# Patient Record
Sex: FEMALE | Race: WHITE | Employment: FULL TIME | URBAN - METROPOLITAN AREA
[De-identification: names, ages, dates, MRNs, and addresses within clinical notes are randomized per-mention and may not be internally consistent; named-entity substitution may affect disease eponyms.]

---

## 2019-04-23 ENCOUNTER — THERAPY VISIT (OUTPATIENT)
Dept: PHYSICAL THERAPY | Facility: CLINIC | Age: 44
End: 2019-04-23
Payer: COMMERCIAL

## 2019-04-23 DIAGNOSIS — S73.191A TEAR OF RIGHT ACETABULAR LABRUM, INITIAL ENCOUNTER: ICD-10-CM

## 2019-04-23 DIAGNOSIS — Z98.890 HISTORY OF HIP SURGERY: ICD-10-CM

## 2019-04-23 DIAGNOSIS — M25.851 HIP IMPINGEMENT SYNDROME, RIGHT: ICD-10-CM

## 2019-04-23 PROCEDURE — 97110 THERAPEUTIC EXERCISES: CPT | Mod: GP

## 2019-04-23 PROCEDURE — 97161 PT EVAL LOW COMPLEX 20 MIN: CPT | Mod: GP

## 2019-04-23 ASSESSMENT — ACTIVITIES OF DAILY LIVING (ADL)
GOING_DOWN_1_FLIGHT_OF_STAIRS: EXTREME DIFFICULTY
DEEP_SQUATTING: UNABLE TO DO
HOS_ADL_SCORE(%): 14.71
WALKING_DOWN_STEEP_HILLS: UNABLE TO DO
WALKING_APPROXIMATELY_10_MINUTES: UNABLE TO DO
PUTTING_ON_SOCKS_AND_SHOES: EXTREME DIFFICULTY
HEAVY_WORK: UNABLE TO DO
GETTING_INTO_AND_OUT_OF_A_BATHTUB: UNABLE TO DO
GOING_UP_1_FLIGHT_OF_STAIRS: EXTREME DIFFICULTY
GETTING_INTO_AND_OUT_OF_AN_AVERAGE_CAR: MODERATE DIFFICULTY
TWISTING/PIVOTING_ON_INVOLVED_LEG: UNABLE TO DO
ROLLING_OVER_IN_BED: EXTREME DIFFICULTY
WALKING_15_MINUTES_OR_GREATER: UNABLE TO DO
STEPPING_UP_AND_DOWN_CURBS: EXTREME DIFFICULTY
HOS_ADL_HIGHEST_POTENTIAL_SCORE: 68
STANDING_FOR_15_MINUTES: MODERATE DIFFICULTY
WALKING_INITIALLY: MODERATE DIFFICULTY
WALKING_UP_STEEP_HILLS: UNABLE TO DO
HOW_WOULD_YOU_RATE_YOUR_CURRENT_LEVEL_OF_FUNCTION_DURING_YOUR_USUAL_ACTIVITIES_OF_DAILY_LIVING_FROM_0_TO_100_WITH_100_BEING_YOUR_LEVEL_OF_FUNCTION_PRIOR_TO_YOUR_HIP_PROBLEM_AND_0_BEING_THE_INABILITY_TO_PERFORM_ANY_OF_YOUR_USUAL_DAILY_ACTIVITIES?: 2
SITTING_FOR_15_MINUTES: MODERATE DIFFICULTY
HOS_ADL_ITEM_SCORE_TOTAL: 10
HOS_ADL_COUNT: 17
RECREATIONAL_ACTIVITIES: UNABLE TO DO
LIGHT_TO_MODERATE_WORK: UNABLE TO DO

## 2019-04-23 NOTE — LETTER
[facility]  [address] [place] [address]  [address]  [facility] [id number] [phone]    2019    Re: Kt Mast   :   1975  MRN:  [id number]   REFERRING PHYSICIAN:   Sanchez Goode    [facility] PH[facility]    Date of Initial Evaluation:  19   Visits:  Rxs Used: 1  Reason for Referral:     Hip impingement syndrome, right  Tear of right acetabular labrum, initial encounter  History of hip surgery    EVALUATION SUMMARY    [facility] Initial Evaluation    Subjective:    Kt Mast is a 43 year old female with a right hip condition.  Condition occurred with:  Other reason.  Condition occurred: other.  This is a new condition  S/P: R hip labral repair with aimee work on 19.  Likes to run and be active but not for 2 years due to pain.  Has 7 & 8 y/o kids.  .    Patient reports pain:  Joint.     and is intermittent and reported as 9/10.  Associated symptoms:  Loss of motion/stiffness and loss of strength. Pain is the same all the time.  Symptoms are exacerbated by activity and descending stairs and relieved by nothing.  Since onset symptoms are gradually improving.                        Pertinent medical history includes:  Cancer (Basal cell carcinoma).  Medical allergies: no.    Current medications:  Anti-inflammatory and pain medication.  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           Objective:    Gait:  20# max foot-flat R LE  Gait Type:  Antalgic   Weight Bearing Status:  PWB   Assistive Devices:  Crutches  Hip Evaluation  Hip PROM:    Flexion: Left:  Right: 75  Extension: Left:  Right: 10  Abduction: Left:   Right: 10  Internal Rotation: Left:   Right: 10  External Rotation: Left:   Right: 10  Hip Strength:  : Deferred at this time.  Hip Palpation:  Palpations normal left hip: MIld TTP about surgery  site.    Re: Kt Mast   :   1975            Assessment/Plan:      Patient is a 43 year old female with right side hip complaints.    Patient has the following significant findings with corresponding treatment plan.                Diagnosis 1:  S/P R hip labral repair with aimee work 19  Decreased ROM/flexibility - manual therapy and therapeutic exercise  Decreased strength - therapeutic exercise and therapeutic activities  Impaired muscle performance - neuro re-education  Decreased function - therapeutic activities    Therapy Evaluation Codes:     1) Clinical presentation characteristics are:   Stable/Uncomplicated.  2) Decision-Making    Low complexity using standardized patient assessment instrument and/or measureable assessment of functional outcome.  Cumulative Therapy Evaluation is: Low complexity.  Previous and current functional limitations:  (See Goal Flow Sheet for this information)    Short term and Long term goals: (See Goal Flow Sheet for this information)   Communication ability:  Patient appears to be able to clearly communicate and understand verbal and written communication and follow directions correctly.  Treatment Explanation - The following has been discussed with the patient:   RX ordered/plan of care  Anticipated outcomes  Possible risks and side effects  This patient would benefit from PT intervention to resume normal activities.   Rehab potential is good.  Frequency:  2 X week, once daily  Duration:  for 4 weeks followed by 1x/wk x 4-6 wks  Discharge Plan:  Achieve all LTG.  Independent in home treatment program.  Reach maximal therapeutic benefit.             Thank you for your referral.    INQUIRIES  Therapist:  Kam Castro, PT   [facility]  [address] [place] [address]  [address]  [facility] [address]  Phone: [phone]  Fax: [phone]

## 2019-04-23 NOTE — PROGRESS NOTES
[facility] Initial Evaluation  Subjective:                                       Pertinent medical history includes:  Cancer (Basal cell carcinoma).  Medical allergies: no.    Current medications:  Anti-inflammatory and pain medication.  Current occupation is   .    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                                Objective:  System    Physical Exam    General     ROS    Assessment/Plan:

## 2019-04-23 NOTE — PROGRESS NOTES
[facility] Initial Evaluation  Subjective:    Kt Mast is a 43 year old female with a right hip condition.  Condition occurred with:  Other reason.  Condition occurred: other.  This is a new condition  S/P: R hip labral repair with aimee work on 4/22/19.  Likes to run and be active but not for 2 years due to pain.  Has 7 & 8 y/o kids.  .    Patient reports pain:  Joint.     and is intermittent and reported as 9/10.  Associated symptoms:  Loss of motion/stiffness and loss of strength. Pain is the same all the time.  Symptoms are exacerbated by activity and descending stairs and relieved by nothing.  Since onset symptoms are gradually improving.                                                      Objective:    Gait:  20# max foot-flat R LE  Gait Type:  Antalgic   Weight Bearing Status:  PWB   Assistive Devices:  Crutches                                                   Hip Evaluation  Hip PROM:    Flexion: Left:  Right: 75  Extension: Left:  Right: 10  Abduction: Left:   Right: 10    Internal Rotation: Left:   Right: 10  External Rotation: Left:   Right: 10              Hip Strength:  : Deferred at this time.                            Hip Palpation:  Palpations normal left hip: MIld TTP about surgery site.                 General     ROS    Assessment/Plan:    Patient is a 43 year old female with right side hip complaints.    Patient has the following significant findings with corresponding treatment plan.                Diagnosis 1:  S/P R hip labral repair with aimee work 4/22/19  Decreased ROM/flexibility - manual therapy and therapeutic exercise  Decreased strength - therapeutic exercise and therapeutic activities  Impaired muscle performance - neuro re-education  Decreased function - therapeutic activities    Therapy Evaluation Codes:          1) Clinical presentation characteristics are:   Stable/Uncomplicated.  2) Decision-Making    Low complexity using standardized patient  assessment instrument and/or measureable assessment of functional outcome.  Cumulative Therapy Evaluation is: Low complexity.    Previous and current functional limitations:  (See Goal Flow Sheet for this information)    Short term and Long term goals: (See Goal Flow Sheet for this information)     Communication ability:  Patient appears to be able to clearly communicate and understand verbal and written communication and follow directions correctly.  Treatment Explanation - The following has been discussed with the patient:   RX ordered/plan of care  Anticipated outcomes  Possible risks and side effects  This patient would benefit from PT intervention to resume normal activities.   Rehab potential is good.    Frequency:  2 X week, once daily  Duration:  for 4 weeks followed by 1x/wk x 4-6 wks  Discharge Plan:  Achieve all LTG.  Independent in home treatment program.  Reach maximal therapeutic benefit.    Please refer to the daily flowsheet for treatment today, total treatment time and time spent performing 1:1 timed codes.

## 2019-04-26 ENCOUNTER — THERAPY VISIT (OUTPATIENT)
Dept: PHYSICAL THERAPY | Facility: CLINIC | Age: 44
End: 2019-04-26
Payer: COMMERCIAL

## 2019-04-26 DIAGNOSIS — S73.191A TEAR OF RIGHT ACETABULAR LABRUM, INITIAL ENCOUNTER: ICD-10-CM

## 2019-04-26 DIAGNOSIS — M25.851 HIP IMPINGEMENT SYNDROME, RIGHT: ICD-10-CM

## 2019-04-26 DIAGNOSIS — Z98.890 HISTORY OF HIP SURGERY: ICD-10-CM

## 2019-04-26 PROCEDURE — 97110 THERAPEUTIC EXERCISES: CPT | Mod: GP

## 2019-04-26 PROCEDURE — 97140 MANUAL THERAPY 1/> REGIONS: CPT | Mod: GP

## 2019-04-29 ENCOUNTER — THERAPY VISIT (OUTPATIENT)
Dept: PHYSICAL THERAPY | Facility: CLINIC | Age: 44
End: 2019-04-29
Payer: COMMERCIAL

## 2019-04-29 DIAGNOSIS — S73.191A TEAR OF RIGHT ACETABULAR LABRUM, INITIAL ENCOUNTER: ICD-10-CM

## 2019-04-29 DIAGNOSIS — Z98.890 HISTORY OF HIP SURGERY: ICD-10-CM

## 2019-04-29 DIAGNOSIS — M25.851 HIP IMPINGEMENT SYNDROME, RIGHT: ICD-10-CM

## 2019-04-29 PROCEDURE — 97140 MANUAL THERAPY 1/> REGIONS: CPT | Mod: GP

## 2019-04-29 PROCEDURE — 97110 THERAPEUTIC EXERCISES: CPT | Mod: GP

## 2019-05-01 ENCOUNTER — THERAPY VISIT (OUTPATIENT)
Dept: PHYSICAL THERAPY | Facility: CLINIC | Age: 44
End: 2019-05-01
Payer: COMMERCIAL

## 2019-05-01 DIAGNOSIS — Z98.890 HISTORY OF HIP SURGERY: ICD-10-CM

## 2019-05-01 DIAGNOSIS — S73.191A TEAR OF RIGHT ACETABULAR LABRUM, INITIAL ENCOUNTER: ICD-10-CM

## 2019-05-01 DIAGNOSIS — M25.851 HIP IMPINGEMENT SYNDROME, RIGHT: ICD-10-CM

## 2019-05-01 PROCEDURE — 97110 THERAPEUTIC EXERCISES: CPT | Mod: GP

## 2019-05-01 PROCEDURE — 97140 MANUAL THERAPY 1/> REGIONS: CPT | Mod: GP

## 2019-05-06 ENCOUNTER — THERAPY VISIT (OUTPATIENT)
Dept: PHYSICAL THERAPY | Facility: CLINIC | Age: 44
End: 2019-05-06
Payer: COMMERCIAL

## 2019-05-06 DIAGNOSIS — S73.191A TEAR OF RIGHT ACETABULAR LABRUM, INITIAL ENCOUNTER: ICD-10-CM

## 2019-05-06 DIAGNOSIS — M25.851 HIP IMPINGEMENT SYNDROME, RIGHT: ICD-10-CM

## 2019-05-06 DIAGNOSIS — Z98.890 HISTORY OF HIP SURGERY: ICD-10-CM

## 2019-05-06 PROCEDURE — 97110 THERAPEUTIC EXERCISES: CPT | Mod: GP

## 2019-05-06 PROCEDURE — 97140 MANUAL THERAPY 1/> REGIONS: CPT | Mod: GP

## 2019-05-08 ENCOUNTER — THERAPY VISIT (OUTPATIENT)
Dept: PHYSICAL THERAPY | Facility: CLINIC | Age: 44
End: 2019-05-08
Payer: COMMERCIAL

## 2019-05-08 DIAGNOSIS — Z98.890 HISTORY OF HIP SURGERY: ICD-10-CM

## 2019-05-08 DIAGNOSIS — M25.851 HIP IMPINGEMENT SYNDROME, RIGHT: ICD-10-CM

## 2019-05-08 DIAGNOSIS — S73.191A TEAR OF RIGHT ACETABULAR LABRUM, INITIAL ENCOUNTER: ICD-10-CM

## 2019-05-08 PROCEDURE — 97110 THERAPEUTIC EXERCISES: CPT | Mod: GP

## 2019-05-08 PROCEDURE — 97140 MANUAL THERAPY 1/> REGIONS: CPT | Mod: GP

## 2019-05-13 ENCOUNTER — THERAPY VISIT (OUTPATIENT)
Dept: PHYSICAL THERAPY | Facility: CLINIC | Age: 44
End: 2019-05-13
Payer: COMMERCIAL

## 2019-05-13 DIAGNOSIS — M25.851 HIP IMPINGEMENT SYNDROME, RIGHT: ICD-10-CM

## 2019-05-13 DIAGNOSIS — Z98.890 HISTORY OF HIP SURGERY: ICD-10-CM

## 2019-05-13 DIAGNOSIS — S73.191A TEAR OF RIGHT ACETABULAR LABRUM, INITIAL ENCOUNTER: ICD-10-CM

## 2019-05-13 PROCEDURE — 97110 THERAPEUTIC EXERCISES: CPT | Mod: GP

## 2019-05-13 PROCEDURE — 97140 MANUAL THERAPY 1/> REGIONS: CPT | Mod: GP

## 2019-05-15 ENCOUNTER — THERAPY VISIT (OUTPATIENT)
Dept: PHYSICAL THERAPY | Facility: CLINIC | Age: 44
End: 2019-05-15
Payer: COMMERCIAL

## 2019-05-15 DIAGNOSIS — M25.851 HIP IMPINGEMENT SYNDROME, RIGHT: ICD-10-CM

## 2019-05-15 DIAGNOSIS — S73.191A TEAR OF RIGHT ACETABULAR LABRUM, INITIAL ENCOUNTER: ICD-10-CM

## 2019-05-15 DIAGNOSIS — Z98.890 HISTORY OF HIP SURGERY: ICD-10-CM

## 2019-05-15 PROCEDURE — 97110 THERAPEUTIC EXERCISES: CPT | Mod: GP

## 2019-05-15 PROCEDURE — 97140 MANUAL THERAPY 1/> REGIONS: CPT | Mod: GP

## 2019-05-20 ENCOUNTER — THERAPY VISIT (OUTPATIENT)
Dept: PHYSICAL THERAPY | Facility: CLINIC | Age: 44
End: 2019-05-20
Payer: COMMERCIAL

## 2019-05-20 DIAGNOSIS — S73.191A TEAR OF RIGHT ACETABULAR LABRUM, INITIAL ENCOUNTER: ICD-10-CM

## 2019-05-20 DIAGNOSIS — M25.851 HIP IMPINGEMENT SYNDROME, RIGHT: ICD-10-CM

## 2019-05-20 DIAGNOSIS — Z98.890 HISTORY OF HIP SURGERY: ICD-10-CM

## 2019-05-20 PROCEDURE — 97110 THERAPEUTIC EXERCISES: CPT | Mod: GP

## 2019-05-20 PROCEDURE — 97530 THERAPEUTIC ACTIVITIES: CPT | Mod: GP

## 2019-05-29 ENCOUNTER — THERAPY VISIT (OUTPATIENT)
Dept: PHYSICAL THERAPY | Facility: CLINIC | Age: 44
End: 2019-05-29
Payer: COMMERCIAL

## 2019-05-29 DIAGNOSIS — Z98.890 HISTORY OF HIP SURGERY: ICD-10-CM

## 2019-05-29 DIAGNOSIS — M25.851 HIP IMPINGEMENT SYNDROME, RIGHT: ICD-10-CM

## 2019-05-29 DIAGNOSIS — S73.191A TEAR OF RIGHT ACETABULAR LABRUM, INITIAL ENCOUNTER: ICD-10-CM

## 2019-05-29 PROCEDURE — 97110 THERAPEUTIC EXERCISES: CPT | Mod: GP

## 2019-05-29 PROCEDURE — 97530 THERAPEUTIC ACTIVITIES: CPT | Mod: GP

## 2019-06-12 ENCOUNTER — THERAPY VISIT (OUTPATIENT)
Dept: PHYSICAL THERAPY | Facility: CLINIC | Age: 44
End: 2019-06-12
Payer: COMMERCIAL

## 2019-06-12 DIAGNOSIS — S73.191A TEAR OF RIGHT ACETABULAR LABRUM, INITIAL ENCOUNTER: ICD-10-CM

## 2019-06-12 DIAGNOSIS — M25.851 HIP IMPINGEMENT SYNDROME, RIGHT: ICD-10-CM

## 2019-06-12 DIAGNOSIS — Z98.890 HISTORY OF HIP SURGERY: ICD-10-CM

## 2019-06-12 PROCEDURE — 97530 THERAPEUTIC ACTIVITIES: CPT | Mod: GP

## 2019-06-12 PROCEDURE — 97140 MANUAL THERAPY 1/> REGIONS: CPT | Mod: GP

## 2019-06-12 PROCEDURE — 97110 THERAPEUTIC EXERCISES: CPT | Mod: GP

## 2019-06-17 ENCOUNTER — THERAPY VISIT (OUTPATIENT)
Dept: PHYSICAL THERAPY | Facility: CLINIC | Age: 44
End: 2019-06-17
Payer: COMMERCIAL

## 2019-06-17 DIAGNOSIS — S73.191A TEAR OF RIGHT ACETABULAR LABRUM, INITIAL ENCOUNTER: ICD-10-CM

## 2019-06-17 DIAGNOSIS — Z98.890 HISTORY OF HIP SURGERY: ICD-10-CM

## 2019-06-17 DIAGNOSIS — M25.851 HIP IMPINGEMENT SYNDROME, RIGHT: ICD-10-CM

## 2019-06-17 PROCEDURE — 97530 THERAPEUTIC ACTIVITIES: CPT | Mod: GP

## 2019-06-17 PROCEDURE — 97110 THERAPEUTIC EXERCISES: CPT | Mod: GP

## 2019-06-17 ASSESSMENT — ACTIVITIES OF DAILY LIVING (ADL)
WALKING_15_MINUTES_OR_GREATER: EXTREME DIFFICULTY
DEEP_SQUATTING: SLIGHT DIFFICULTY
HOS_ADL_COUNT: 17
WALKING_APPROXIMATELY_10_MINUTES: MODERATE DIFFICULTY
STANDING_FOR_15_MINUTES: SLIGHT DIFFICULTY
STEPPING_UP_AND_DOWN_CURBS: SLIGHT DIFFICULTY
WALKING_INITIALLY: SLIGHT DIFFICULTY
HOS_ADL_ITEM_SCORE_TOTAL: 31
GOING_DOWN_1_FLIGHT_OF_STAIRS: NO DIFFICULTY AT ALL
WALKING_UP_STEEP_HILLS: EXTREME DIFFICULTY
HOS_ADL_SCORE(%): 45.59
TWISTING/PIVOTING_ON_INVOLVED_LEG: UNABLE TO DO
ROLLING_OVER_IN_BED: SLIGHT DIFFICULTY
RECREATIONAL_ACTIVITIES: UNABLE TO DO
WALKING_DOWN_STEEP_HILLS: UNABLE TO DO
SITTING_FOR_15_MINUTES: SLIGHT DIFFICULTY
HEAVY_WORK: UNABLE TO DO
HOS_ADL_HIGHEST_POTENTIAL_SCORE: 68
GETTING_INTO_AND_OUT_OF_AN_AVERAGE_CAR: SLIGHT DIFFICULTY
PUTTING_ON_SOCKS_AND_SHOES: SLIGHT DIFFICULTY
HOW_WOULD_YOU_RATE_YOUR_CURRENT_LEVEL_OF_FUNCTION_DURING_YOUR_USUAL_ACTIVITIES_OF_DAILY_LIVING_FROM_0_TO_100_WITH_100_BEING_YOUR_LEVEL_OF_FUNCTION_PRIOR_TO_YOUR_HIP_PROBLEM_AND_0_BEING_THE_INABILITY_TO_PERFORM_ANY_OF_YOUR_USUAL_DAILY_ACTIVITIES?: 50
GOING_UP_1_FLIGHT_OF_STAIRS: MODERATE DIFFICULTY
GETTING_INTO_AND_OUT_OF_A_BATHTUB: UNABLE TO DO
LIGHT_TO_MODERATE_WORK: SLIGHT DIFFICULTY

## 2019-06-24 ENCOUNTER — THERAPY VISIT (OUTPATIENT)
Dept: PHYSICAL THERAPY | Facility: CLINIC | Age: 44
End: 2019-06-24
Payer: COMMERCIAL

## 2019-06-24 DIAGNOSIS — S73.191A TEAR OF RIGHT ACETABULAR LABRUM, INITIAL ENCOUNTER: ICD-10-CM

## 2019-06-24 DIAGNOSIS — Z98.890 HISTORY OF HIP SURGERY: ICD-10-CM

## 2019-06-24 DIAGNOSIS — M25.851 HIP IMPINGEMENT SYNDROME, RIGHT: ICD-10-CM

## 2019-06-24 PROCEDURE — 97110 THERAPEUTIC EXERCISES: CPT | Mod: GP

## 2019-06-24 PROCEDURE — 97530 THERAPEUTIC ACTIVITIES: CPT | Mod: GP

## 2019-07-01 ENCOUNTER — THERAPY VISIT (OUTPATIENT)
Dept: PHYSICAL THERAPY | Facility: CLINIC | Age: 44
End: 2019-07-01
Payer: COMMERCIAL

## 2019-07-01 DIAGNOSIS — Z98.890 HISTORY OF HIP SURGERY: ICD-10-CM

## 2019-07-01 DIAGNOSIS — M25.851 HIP IMPINGEMENT SYNDROME, RIGHT: ICD-10-CM

## 2019-07-01 DIAGNOSIS — S73.191A TEAR OF RIGHT ACETABULAR LABRUM, INITIAL ENCOUNTER: ICD-10-CM

## 2019-07-01 PROCEDURE — 97530 THERAPEUTIC ACTIVITIES: CPT | Mod: GP

## 2019-07-01 PROCEDURE — 97110 THERAPEUTIC EXERCISES: CPT | Mod: GP

## 2019-07-15 ENCOUNTER — THERAPY VISIT (OUTPATIENT)
Dept: PHYSICAL THERAPY | Facility: CLINIC | Age: 44
End: 2019-07-15
Payer: COMMERCIAL

## 2019-07-15 DIAGNOSIS — Z98.890 HISTORY OF HIP SURGERY: ICD-10-CM

## 2019-07-15 DIAGNOSIS — M25.851 HIP IMPINGEMENT SYNDROME, RIGHT: ICD-10-CM

## 2019-07-15 DIAGNOSIS — S73.191A TEAR OF RIGHT ACETABULAR LABRUM: ICD-10-CM

## 2019-07-15 PROCEDURE — 97112 NEUROMUSCULAR REEDUCATION: CPT | Mod: GP

## 2019-07-15 PROCEDURE — 97530 THERAPEUTIC ACTIVITIES: CPT | Mod: GP

## 2019-07-15 PROCEDURE — 97110 THERAPEUTIC EXERCISES: CPT | Mod: GP

## 2019-07-29 ENCOUNTER — THERAPY VISIT (OUTPATIENT)
Dept: PHYSICAL THERAPY | Facility: CLINIC | Age: 44
End: 2019-07-29
Payer: COMMERCIAL

## 2019-07-29 DIAGNOSIS — Z98.890 HISTORY OF HIP SURGERY: ICD-10-CM

## 2019-07-29 DIAGNOSIS — M25.851 HIP IMPINGEMENT SYNDROME, RIGHT: ICD-10-CM

## 2019-07-29 DIAGNOSIS — S73.191A TEAR OF RIGHT ACETABULAR LABRUM, INITIAL ENCOUNTER: ICD-10-CM

## 2019-07-29 PROCEDURE — 97110 THERAPEUTIC EXERCISES: CPT | Mod: GP

## 2019-07-29 PROCEDURE — 97112 NEUROMUSCULAR REEDUCATION: CPT | Mod: GP

## 2019-07-29 PROCEDURE — 97530 THERAPEUTIC ACTIVITIES: CPT | Mod: GP

## 2019-07-29 ASSESSMENT — ACTIVITIES OF DAILY LIVING (ADL)
STANDING_FOR_15_MINUTES: SLIGHT DIFFICULTY
HOS_ADL_COUNT: 17
WALKING_UP_STEEP_HILLS: MODERATE DIFFICULTY
WALKING_APPROXIMATELY_10_MINUTES: SLIGHT DIFFICULTY
LIGHT_TO_MODERATE_WORK: SLIGHT DIFFICULTY
HEAVY_WORK: EXTREME DIFFICULTY
PUTTING_ON_SOCKS_AND_SHOES: SLIGHT DIFFICULTY
HOS_ADL_SCORE(%): 63.24
WALKING_INITIALLY: SLIGHT DIFFICULTY
DEEP_SQUATTING: SLIGHT DIFFICULTY
ROLLING_OVER_IN_BED: SLIGHT DIFFICULTY
GETTING_INTO_AND_OUT_OF_A_BATHTUB: SLIGHT DIFFICULTY
STEPPING_UP_AND_DOWN_CURBS: SLIGHT DIFFICULTY
TWISTING/PIVOTING_ON_INVOLVED_LEG: EXTREME DIFFICULTY
HOS_ADL_ITEM_SCORE_TOTAL: 43
GOING_UP_1_FLIGHT_OF_STAIRS: SLIGHT DIFFICULTY
GOING_DOWN_1_FLIGHT_OF_STAIRS: NO DIFFICULTY AT ALL
WALKING_DOWN_STEEP_HILLS: EXTREME DIFFICULTY
HOS_ADL_HIGHEST_POTENTIAL_SCORE: 68
SITTING_FOR_15_MINUTES: NO DIFFICULTY AT ALL
WALKING_15_MINUTES_OR_GREATER: SLIGHT DIFFICULTY
GETTING_INTO_AND_OUT_OF_AN_AVERAGE_CAR: SLIGHT DIFFICULTY
RECREATIONAL_ACTIVITIES: EXTREME DIFFICULTY

## 2019-07-30 ENCOUNTER — THERAPY VISIT (OUTPATIENT)
Dept: PHYSICAL THERAPY | Facility: CLINIC | Age: 44
End: 2019-07-30
Payer: COMMERCIAL

## 2019-07-30 DIAGNOSIS — M54.41 ACUTE RIGHT-SIDED LOW BACK PAIN WITH RIGHT-SIDED SCIATICA: ICD-10-CM

## 2019-07-30 PROCEDURE — 97530 THERAPEUTIC ACTIVITIES: CPT | Mod: GP | Performed by: PHYSICAL THERAPIST

## 2019-07-30 PROCEDURE — 97161 PT EVAL LOW COMPLEX 20 MIN: CPT | Mod: GP | Performed by: PHYSICAL THERAPIST

## 2019-07-30 PROCEDURE — 97110 THERAPEUTIC EXERCISES: CPT | Mod: GP | Performed by: PHYSICAL THERAPIST

## 2019-07-30 NOTE — PROGRESS NOTES
[facility] Initial Evaluation  Subjective:  Kt reports that she underwent a R hip arthroscopic surgery on 4/22/19.  Since her surgery, her R LB/SIJ has been bothersome.  She returned to see Dr. Goode on 7/2/19 and was referred to PT for evaluation and treatment of R SIJ/R LBP.  Kt reports that prior to her surgery she had some of her current issues but they were improved with surgery- started to return 7-8 weeks post-op.  In the past, Kt has undergone PT for low back and hip but with only temporary relief.      The history is provided by the patient. No  was used.   Kt Mast being seen for LBP/R SIJ pain.   Problem began 7/2/2019 (MD order). Where condition occurred: for unknown reasons.Problem occurred: no cause  and reported as 7/10 on pain scale. General health as reported by patient is good. Pertinent medical history includes:  Cancer (basal cell carcinoma).  Medical allergies: none reported.  Surgeries include:  None.  Current medications:  None.   Primary job tasks include:  Computer work, prolonged sitting and prolonged standing.  Pain is described as burning (tight) and is constant. Pain timing: activity-dependent. Since onset symptoms are unchanged. Imaging testing: no recent lumbar imaging.     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 Restrictions include:  Working in normal job without restrictions.    Barriers include:  None as reported by patient.  Red flags:  None as reported by patient.  Type of problem:  Lumbar and sacroiliac   Condition occurred with:  Insidious onset. This is a recurrent condition    Patient reports pain:  Lower lumbar spine and SI joint right. Radiates to:  Gluteals right, thigh right, foot right and lower leg right. Associated symptoms:  Numbness (incisional numbness). Symptoms are exacerbated by sitting, standing, certain positions, carrying, bending, lifting, lying down and walking and relieved by rest  (temporary relief from adductor shotgun).                      Objective:  System    Physical Exam      [facility] Lumbar Evaluation    Posture:  Sitting: fair  Standing: fair  Lordosis: Reduced    Correction of Posture: better    Movement Loss:  Flexion (Flex): mod  Extension (EXT): mod  Side Glide R (SG R): mod and pain  Side Glide L (SG L): min  Test Movements:    EIS: During: increases  After: no worse  Mechanical Response: no effect      EIL: During: abolishes  After: no better  Mechanical Response: no effect  Repeat EIL: During: abolishes  After: no better  Mechanical Response: no effect  SGIS R: During: increases and centralizing  After: no better  Mechanical Response: no effect  Repeat SGIS R: During: decreases and centralizing  After: better  Mechanical Response: IncROM      Conclusion: derangement  Principle of Treatment:  Posture Correction: mild correction of sitting posture with lumbar roll- as long as decreases pain as does in clinic- discussed variable nature of lateral shift and non-use of roll if radicular pain increases.       Lateral: repeated R SGIG x10-20, every 2-3 hours or as needed for relief                                     Musculoskeletal:        Legs:      ROS  Discussed current HEP for R hip strengthening and instructed pt to temporarily avoid any exercises or activity that increased or peripheralizes radicular symptoms.  No SIJ evaluation today as symptoms were more centrally located in lumbar spine and indicative of higher lumbar spine as source; will evaluate SIJ specifically as needed.    Assessment/Plan:    Patient is a 43 year old female with lumbar complaints.    Patient has the following significant findings with corresponding treatment plan.                Diagnosis 1:  R LBP/R SIJ pain with R sciatica  Pain -  hot/cold therapy, manual therapy, self management, education, directional preference exercise and home program  Decreased ROM/flexibility - manual therapy, therapeutic  exercise and home program  Decreased joint mobility - manual therapy, therapeutic exercise and home program  Impaired gait - gait training and home program  Impaired muscle performance - neuro re-education and home program  Decreased function - therapeutic activities and home program  Impaired posture - neuro re-education and home program    Therapy Evaluation Codes:   1) History comprised of:   Personal factors that impact the plan of care:      None.    Comorbidity factors that impact the plan of care are:      None.     Medications impacting care: None.  2) Examination of Body Systems comprised of:   Body structures and functions that impact the plan of care:      Lumbar spine and Sacral illiac joint.   Activity limitations that impact the plan of care are:      Bending, Dressing, Lifting, Sitting, Sports, Squatting/kneeling, Stairs, Standing, Walking, Sleeping and Laying down.  3) Clinical presentation characteristics are:   Stable/Uncomplicated.  4) Decision-Making    Low complexity using standardized patient assessment instrument and/or measureable assessment of functional outcome.  Cumulative Therapy Evaluation is: Low complexity.    Previous and current functional limitations:  (See Goal Flow Sheet for this information)    Short term and Long term goals: (See Goal Flow Sheet for this information)     Communication ability:  Patient appears to be able to clearly communicate and understand verbal and written communication and follow directions correctly.  Treatment Explanation - The following has been discussed with the patient:   RX ordered/plan of care  Anticipated outcomes  Possible risks and side effects  This patient would benefit from PT intervention to resume normal activities.   Rehab potential is excellent.    Frequency:  1 X week, once daily  Duration:  for 4 weeks (up to 4 visits per MD).   Discharge Plan:  Achieve all LTG.  Independent in home treatment program.  Reach maximal therapeutic  benefit.    Please refer to the daily flowsheet for treatment today, total treatment time and time spent performing 1:1 timed codes.

## 2019-08-06 ENCOUNTER — THERAPY VISIT (OUTPATIENT)
Dept: PHYSICAL THERAPY | Facility: CLINIC | Age: 44
End: 2019-08-06
Payer: COMMERCIAL

## 2019-08-06 DIAGNOSIS — M54.41 ACUTE RIGHT-SIDED LOW BACK PAIN WITH RIGHT-SIDED SCIATICA: ICD-10-CM

## 2019-08-06 PROCEDURE — 97530 THERAPEUTIC ACTIVITIES: CPT | Mod: GP | Performed by: PHYSICAL THERAPIST

## 2019-08-06 PROCEDURE — 97110 THERAPEUTIC EXERCISES: CPT | Mod: GP | Performed by: PHYSICAL THERAPIST

## 2019-08-09 ENCOUNTER — THERAPY VISIT (OUTPATIENT)
Dept: PHYSICAL THERAPY | Facility: CLINIC | Age: 44
End: 2019-08-09
Payer: COMMERCIAL

## 2019-08-09 DIAGNOSIS — M54.41 ACUTE RIGHT-SIDED LOW BACK PAIN WITH RIGHT-SIDED SCIATICA: ICD-10-CM

## 2019-08-09 PROCEDURE — 97530 THERAPEUTIC ACTIVITIES: CPT | Mod: GP | Performed by: PHYSICAL THERAPIST

## 2019-08-09 PROCEDURE — 97110 THERAPEUTIC EXERCISES: CPT | Mod: GP | Performed by: PHYSICAL THERAPIST

## 2019-08-26 ENCOUNTER — THERAPY VISIT (OUTPATIENT)
Dept: PHYSICAL THERAPY | Facility: CLINIC | Age: 44
End: 2019-08-26
Payer: COMMERCIAL

## 2019-08-26 DIAGNOSIS — M25.851 HIP IMPINGEMENT SYNDROME, RIGHT: ICD-10-CM

## 2019-08-26 DIAGNOSIS — Z98.890 HISTORY OF HIP SURGERY: ICD-10-CM

## 2019-08-26 DIAGNOSIS — S73.191A TEAR OF RIGHT ACETABULAR LABRUM, INITIAL ENCOUNTER: ICD-10-CM

## 2019-08-26 PROCEDURE — 97112 NEUROMUSCULAR REEDUCATION: CPT | Mod: GP

## 2019-08-26 PROCEDURE — 97530 THERAPEUTIC ACTIVITIES: CPT | Mod: GP

## 2019-08-29 ENCOUNTER — THERAPY VISIT (OUTPATIENT)
Dept: PHYSICAL THERAPY | Facility: CLINIC | Age: 44
End: 2019-08-29
Payer: COMMERCIAL

## 2019-08-29 DIAGNOSIS — M54.41 ACUTE RIGHT-SIDED LOW BACK PAIN WITH RIGHT-SIDED SCIATICA: ICD-10-CM

## 2019-08-29 PROCEDURE — 97140 MANUAL THERAPY 1/> REGIONS: CPT | Mod: GP | Performed by: PHYSICAL THERAPIST

## 2019-08-29 PROCEDURE — 97110 THERAPEUTIC EXERCISES: CPT | Mod: GP | Performed by: PHYSICAL THERAPIST

## 2019-08-29 PROCEDURE — 97530 THERAPEUTIC ACTIVITIES: CPT | Mod: GP | Performed by: PHYSICAL THERAPIST

## 2019-08-29 NOTE — LETTER
[facility] PHYSICAL THERAPY  [address]  [place] [id number] [phone]    2019    Re: Kt Mast   :   1975  MRN:  [id number]   REFERRING PHYSICIAN:   Sanchez Goode    [facility] PH[facility]ICAL [facility]    Date of Initial Evaluation:  2019  Visits:  Rxs Used: 4  Reason for Referral:  Acute right-sided low back pain with right-sided sciatica    PROGRESS  REPORT    Progress reporting period is from 19 to 19.       SUBJECTIVE  Subjective: Pain has moved from her hip/glute to the middle of her back.  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  Able to do lock and sag with some pain but overall pain redcued after the exercises.  Doing pressups 2x/day.  Feels that she needs more low back PT in order to continue to progress- feels like this is actually making changes and improvements and she would like to continue.  Sitting at work continues to increase pain, has changed chair 3 times and has best chair now that she has had yet.  Does have standing desk but uses it very little- only aboe to stand for about 15 min.      Current Pain level: 4/10.     Previous pain level was  5/10 Initial Pain level: 7/10.   Changes in function:  Yes (See Goal flowsheet attached for changes in current functional level)  Adverse reaction to treatment or activity: None    OBJECTIVE  Changes noted in objective findings:  Yes, please see below.  Objective: Lumbar AROM: flex mod loss + pain in central LB, ext min loss + mild pinch in central LB, B SG min loss + pain with R SG.  Kt no longer presents with a L lateral lumbar shift and now gets relief from lumbar extension movements in prone ( REIL and REIL with self OP) rather than standing sideglide at wall.  Pain is now centralized and local to lower lumbar spine, no radiation into R gluteals and lateral hip.  Kt has been  instructed increase the frequency of her REIL exercises to every 2-3 hours (6-8x/day) to maintain pain reduction and allow soft tissue healing to occur. Also, recommended to utilize standing option for working- 15 min out of every hour, progressing towards 50:50 (sit:stand) as able for her work day.  At this time, Kt continues to require PT for lumbar spine in order to further reduce pain, improve body mechanics and return to previous level of function.       ASSESSMENT/PLAN  Updated problem list and treatment plan: Diagnosis 1:  R LBP pain with R sciatica  Pain  Re: Kt Mast   :   1975         -  hot/cold therapy, manual therapy, self management, education, directional preference exercise and home program  Decreased ROM/flexibility - manual therapy, therapeutic exercise and home program  Decreased joint mobility - manual therapy, therapeutic exercise and home program  Impaired muscle performance - neuro re-education and home program  Decreased function - therapeutic activities and home program  Impaired posture - neuro re-education and home program  STG/LTGs have been met or progress has been made towards goals:  Yes (See Goal flow sheet completed today.)  Assessment of Progress: The patient's condition is improving.  Self Management Plans:  Patient has been instructed in a home treatment program.  Patient  has been instructed in self management of symptoms.  I have re-evaluated this patient and find that the nature, scope, duration and intensity of the therapy is appropriate for the medical condition of the patient.  Kt continues to require the following intervention to meet STG and LTG's:  PT    Recommendations:  This patient would benefit from continued therapy.     Frequency:  1 X week, once daily  Duration:  for 4 weeks, tapering to 2x/month for 1 month    Thank you for your referral.      INQUIRIES  Therapist: Val Benavides DPT   [facility]  THERAPY  [address]  [facility] [address]  Phone: [phone]  Fax: [phone]

## 2019-08-29 NOTE — PROGRESS NOTES
Subjective:  HPI  Oswestry Score: 38 %                 Objective:  System    Physical Exam    General     ROS    Assessment/Plan:    PROGRESS  REPORT    Progress reporting period is from 7/30/19 to 8/29/19.       SUBJECTIVE  Subjective: Pain has moved from her hip/glute to the middle of her back.  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  Able to do lock and sag with some pain but overall pain redcued after the exercises.  Doing pressups 2x/day.  Feels that she needs more low back PT in order to continue to progress- feels like this is actually making changes and improvements and she would like to continue.  Sitting at work continues to increase pain, has changed chair 3 times and has best chair now that she has had yet.  Does have standing desk but uses it very little- only aboe to stand for about 15 min.      Current Pain level: 4/10.     Previous pain level was  5/10 Initial Pain level: 7/10.   Changes in function:  Yes (See Goal flowsheet attached for changes in current functional level)  Adverse reaction to treatment or activity: None    OBJECTIVE  Changes noted in objective findings:  Yes, please see below.  Objective: Lumbar AROM: flex mod loss + pain in central LB, ext min loss + mild pinch in central LB, B SG min loss + pain with R SG.  Kt no longer presents with a L lateral lumbar shift and now gets relief from lumbar extension movements in prone ( REIL and REIL with self OP) rather than standing sideglide at wall.  Pain is now centralized and local to lower lumbar spine, no radiation into R gluteals and lateral hip.  Kt has been instructed increase the frequency of her REIL exercises to every 2-3 hours (6-8x/day) to maintain pain reduction and allow soft tissue healing to occur. Also, recommended to utilize standing option for working- 15 min out of every hour, progressing towards 50:50 (sit:stand) as able for her work day.  At this time, Kt continues to require  PT for lumbar spine in order to further reduce pain, improve body mechanics and return to previous level of function.       ASSESSMENT/PLAN  Updated problem list and treatment plan: Diagnosis 1:  R LBP pain with R sciatica  Pain -  hot/cold therapy, manual therapy, self management, education, directional preference exercise and home program  Decreased ROM/flexibility - manual therapy, therapeutic exercise and home program  Decreased joint mobility - manual therapy, therapeutic exercise and home program  Impaired muscle performance - neuro re-education and home program  Decreased function - therapeutic activities and home program  Impaired posture - neuro re-education and home program  STG/LTGs have been met or progress has been made towards goals:  Yes (See Goal flow sheet completed today.)  Assessment of Progress: The patient's condition is improving.  Self Management Plans:  Patient has been instructed in a home treatment program.  Patient  has been instructed in self management of symptoms.  I have re-evaluated this patient and find that the nature, scope, duration and intensity of the therapy is appropriate for the medical condition of the patient.  Kt continues to require the following intervention to meet STG and LTG's:  PT    Recommendations:  This patient would benefit from continued therapy.     Frequency:  1 X week, once daily  Duration:  for 4 weeks, tapering to 2x/month for 1 month        Please refer to the daily flowsheet for treatment today, total treatment time and time spent performing 1:1 timed codes.

## 2019-09-10 ENCOUNTER — THERAPY VISIT (OUTPATIENT)
Dept: PHYSICAL THERAPY | Facility: CLINIC | Age: 44
End: 2019-09-10
Payer: COMMERCIAL

## 2019-09-10 DIAGNOSIS — M54.41 ACUTE RIGHT-SIDED LOW BACK PAIN WITH RIGHT-SIDED SCIATICA: ICD-10-CM

## 2019-09-10 PROCEDURE — 97110 THERAPEUTIC EXERCISES: CPT | Mod: GP | Performed by: PHYSICAL THERAPIST

## 2019-09-10 PROCEDURE — 97530 THERAPEUTIC ACTIVITIES: CPT | Mod: GP | Performed by: PHYSICAL THERAPIST

## 2019-09-10 PROCEDURE — 97140 MANUAL THERAPY 1/> REGIONS: CPT | Mod: GP | Performed by: PHYSICAL THERAPIST

## 2019-09-17 ENCOUNTER — THERAPY VISIT (OUTPATIENT)
Dept: PHYSICAL THERAPY | Facility: CLINIC | Age: 44
End: 2019-09-17
Payer: COMMERCIAL

## 2019-09-17 DIAGNOSIS — M54.41 ACUTE RIGHT-SIDED LOW BACK PAIN WITH RIGHT-SIDED SCIATICA: ICD-10-CM

## 2019-09-17 PROCEDURE — 97110 THERAPEUTIC EXERCISES: CPT | Mod: GP | Performed by: PHYSICAL THERAPIST

## 2019-09-17 PROCEDURE — 97530 THERAPEUTIC ACTIVITIES: CPT | Mod: GP | Performed by: PHYSICAL THERAPIST

## 2019-09-23 ENCOUNTER — THERAPY VISIT (OUTPATIENT)
Dept: PHYSICAL THERAPY | Facility: CLINIC | Age: 44
End: 2019-09-23
Payer: COMMERCIAL

## 2019-09-23 DIAGNOSIS — Z98.890 HISTORY OF HIP SURGERY: ICD-10-CM

## 2019-09-23 DIAGNOSIS — S73.191A TEAR OF RIGHT ACETABULAR LABRUM, INITIAL ENCOUNTER: ICD-10-CM

## 2019-09-23 DIAGNOSIS — M25.851 HIP IMPINGEMENT SYNDROME, RIGHT: ICD-10-CM

## 2019-09-23 PROCEDURE — 97530 THERAPEUTIC ACTIVITIES: CPT | Mod: GP

## 2019-09-23 PROCEDURE — 97110 THERAPEUTIC EXERCISES: CPT | Mod: GP

## 2019-09-23 NOTE — PROGRESS NOTES
Subjective:  HPI                    Objective:  System         Lumbar/SI Evaluation  ROM:      Strength: poor TA recruitment                                                      Hip Evaluation  HIP AROM:    Flexion: Left:   Right:  115          Internal Rotation: Left:   Right: Supine=30; Prone=45          Hip Strength:      Extension:  Right: 4+/5    Pain:    Abduction:  Right: 4+/5    Pain:                                 General     ROS    Assessment/Plan:    PROGRESS  REPORT        SUBJECTIVE  Subjective changes noted by patient:  Difficulty with single leg activities, even as simple as birddog or bike.         Changes in function:  None  Adverse reaction to treatment or activity: None    OBJECTIVE  Changes noted in objective findings:  The objective findings below are from DOS 9/22/19.        ASSESSMENT/PLAN  Updated problem list and treatment plan: Diagnosis 1:  S/P R hip labral repair with aimee work and associated SI dysfunction  Pain -  self management, education and home program  Decreased strength - therapeutic exercise and therapeutic activities  Impaired muscle performance - neuro re-education  Decreased function - therapeutic activities  STG/LTGs have been met or progress has been made towards goals:  Yes (See Goal flow sheet completed today.)  Assessment of Progress: The patient's progress has slowed.  Self Management Plans:  Patient has been instructed in a home treatment program.  I have re-evaluated this patient and find that the nature, scope, duration and intensity of the therapy is appropriate for the medical condition of the patient.  Kt continues to require the following intervention to meet STG and LTG's:  PT    Recommendations:  This patient would benefit from continued therapy.     Frequency:  1 X every 2 months, once daily  Duration:  for 6 months        Please refer to the daily flowsheet for treatment today, total treatment time and time spent performing 1:1 timed codes.

## 2019-09-26 ENCOUNTER — THERAPY VISIT (OUTPATIENT)
Dept: PHYSICAL THERAPY | Facility: CLINIC | Age: 44
End: 2019-09-26
Payer: COMMERCIAL

## 2019-09-26 DIAGNOSIS — M54.41 ACUTE RIGHT-SIDED LOW BACK PAIN WITH RIGHT-SIDED SCIATICA: ICD-10-CM

## 2019-09-26 PROCEDURE — 97112 NEUROMUSCULAR REEDUCATION: CPT | Mod: GP | Performed by: PHYSICAL THERAPIST

## 2019-09-26 PROCEDURE — 97530 THERAPEUTIC ACTIVITIES: CPT | Mod: GP | Performed by: PHYSICAL THERAPIST

## 2019-09-26 PROCEDURE — 97110 THERAPEUTIC EXERCISES: CPT | Mod: GP | Performed by: PHYSICAL THERAPIST

## 2019-10-03 ENCOUNTER — THERAPY VISIT (OUTPATIENT)
Dept: PHYSICAL THERAPY | Facility: CLINIC | Age: 44
End: 2019-10-03
Payer: COMMERCIAL

## 2019-10-03 DIAGNOSIS — M54.41 ACUTE RIGHT-SIDED LOW BACK PAIN WITH RIGHT-SIDED SCIATICA: ICD-10-CM

## 2019-10-03 PROCEDURE — 97530 THERAPEUTIC ACTIVITIES: CPT | Mod: GP | Performed by: PHYSICAL THERAPIST

## 2019-10-03 PROCEDURE — 97110 THERAPEUTIC EXERCISES: CPT | Mod: GP | Performed by: PHYSICAL THERAPIST

## 2019-10-03 PROCEDURE — 97140 MANUAL THERAPY 1/> REGIONS: CPT | Mod: GP | Performed by: PHYSICAL THERAPIST

## 2019-10-10 ENCOUNTER — THERAPY VISIT (OUTPATIENT)
Dept: PHYSICAL THERAPY | Facility: CLINIC | Age: 44
End: 2019-10-10
Payer: COMMERCIAL

## 2019-10-10 DIAGNOSIS — M54.41 ACUTE RIGHT-SIDED LOW BACK PAIN WITH RIGHT-SIDED SCIATICA: ICD-10-CM

## 2019-10-10 PROCEDURE — 97110 THERAPEUTIC EXERCISES: CPT | Mod: GP | Performed by: PHYSICAL THERAPIST

## 2019-10-10 PROCEDURE — 97112 NEUROMUSCULAR REEDUCATION: CPT | Mod: GP | Performed by: PHYSICAL THERAPIST

## 2019-10-10 PROCEDURE — 97140 MANUAL THERAPY 1/> REGIONS: CPT | Mod: GP | Performed by: PHYSICAL THERAPIST

## 2019-10-10 NOTE — PROGRESS NOTES
"Subjective:  HPI  Oswestry Score: 36 %                 Objective:  System    Physical Exam    General     ROS    Assessment/Plan:    PROGRESS  REPORT    Progress reporting period is from 8/29/19 to 10/10/19.       SUBJECTIVE  Subjective: Kt reports that after last session she was nearly painfree but had intense central LBP that night, was better the next day.  Was able to walk a 5K on Sat in the rain with little pain during, but a lot of pain after and felt bad for 2 days.  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  Feels that her posture has never been better and is hopeful that now that the pain is remaining in her back she will continue to improve.  Did not shift back out this week.  She also switched sides in the bed and that has helped.  Overall, slowly improving and noticing that things that used to be \"really bad\" are now \"less bad\".     Current Pain level: 3/10.     Initial Pain level: 7/10.   Changes in function:  Yes (See Goal flowsheet attached for changes in current functional level)  Adverse reaction to treatment or activity: activity - please see above.     OBJECTIVE  Objective: Lumbar AROM: flex mod loss, ext min loss, B SG min loss.  Ambulating without lateral shift today.  Notable improvement in TA firing and endurance; progression of core stab today.  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  Relief from manual OP into lumbar extension with mobilizations and with REIL exercises.  OK'd for addition of rice bag for OP with lock and sag exercise at home.  At this time, Kt is progressing and her radicular symptoms did not return this week.  Pt reports decreasing central LBP overall, but can still increase with certain activities.  Kt " continues to require PT on a regular basis to maintain spine position and to advance core stab.  Pt reports no underlying hip joint pain remaining.       ASSESSMENT/PLAN  Updated problem list and treatment plan: Diagnosis 1:  R LBP pain with R sciatica  Pain -  hot/cold therapy, manual therapy, self management, education, directional preference exercise and home program  Decreased ROM/flexibility - manual therapy, therapeutic exercise and home program  Decreased joint mobility - manual therapy, therapeutic exercise and home program  Decreased strength - therapeutic exercise, therapeutic activities and home program  Impaired muscle performance - neuro re-education and home program  Decreased function - therapeutic activities and home program  Impaired posture - neuro re-education and home program  Instability -  Therapeutic Activity  Therapeutic Exercise  home program  STG/LTGs have been met or progress has been made towards goals:  Yes (See Goal flow sheet completed today.)  Assessment of Progress: The patient's condition is improving.  Self Management Plans:  Patient has been instructed in a home treatment program.  Patient  has been instructed in self management of symptoms.  I have re-evaluated this patient and find that the nature, scope, duration and intensity of the therapy is appropriate for the medical condition of the patient.  Kt continues to require the following intervention to meet STG and LTG's:  PT    Recommendations:  This patient would benefit from continued therapy.     Frequency:  1 X week, once daily  Duration:  for 8 weeks        Please refer to the daily flowsheet for treatment today, total treatment time and time spent performing 1:1 timed codes.

## 2019-10-10 NOTE — LETTER
"[facility]  [address]  [place] [id number] [phone]    October 10, 2019    Re: Kt Mast   :   1975  MRN:  [id number]   REFERRING PHYSICIAN:   Sanchez Goode    [facility] PH[facility]    Date of Initial Evaluation:    Visits:  Rxs Used: 9  Reason for Referral:  Acute right-sided low back pain with right-sided sciatica  PROGRESS  REPORT    Progress reporting period is from 19 to 10/10/19.       SUBJECTIVE  Subjective: Kt reports that after last session she was nearly painfree but had intense central LBP that night, was better the next day.  Was able to walk a 5K on Sat in the rain with little pain during, but a lot of pain after and felt bad for 2 days.  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  Feels that her posture has never been better and is hopeful that now that the pain is remaining in her back she will continue to improve.  Did not shift back out this week.  She also switched sides in the bed and that has helped.  Overall, slowly improving and noticing that things that used to be \"really bad\" are now \"less bad\".     Current Pain level: 3/10.     Initial Pain level: 7/10.   Changes in function:  Yes (See Goal flowsheet attached for changes in current functional level)  Adverse reaction to treatment or activity: activity - please see above.     OBJECTIVE  Objective: Lumbar AROM: flex mod loss, ext min loss, B SG min loss.  Ambulating without lateral shift today.  Notable improvement in TA firing and endurance; progression of core stab today.  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  Relief from manual OP into " lumbar extension with mobilizations and with REIL exercises.  OK'd for addition of rice bag for OP with lock and sag exercise at home.  At this time, Kt is progressing and her radicular symptoms did not return this week.  Pt reports decreasing central LBP overall, but can still increase with certain activities.  Kt continues to require PT on a regular basis to maintain spine position and to advance core stab.  Pt reports no underlying hip joint pain remaining.       ASSESSMENT/PLAN  Updated problem list and treatment plan: Diagnosis 1:  R LBP pain with R sciatica  Pain -  hot/cold therapy, manual therapy, self management, education, directional preference exercise and home program  Decreased ROM/flexibility - manual therapy, therapeutic exercise and home program  Decreased joint mobility - manual therapy, therapeutic exercise and home program  Decreased strength - therapeutic exercise, therapeutic activities and home program  Impaired muscle performance - neuro re-education and home program  Decreased function - therapeutic activities and home program  Impaired posture - neuro re-education and home program  Instability -  Therapeutic Activity  Therapeutic Exercise  home program  STG/LTGs have been met or progress has been made towards goals:  Yes (See Goal flow sheet completed today.)  Assessment of Progress: The patient's condition is improving.  Self Management Plans:  Patient has been instructed in a home treatment program.  Patient  has been instructed in self management of symptoms.  I have re-evaluated this patient and find that the nature, scope, duration and intensity of the therapy is appropriate for the medical condition of the patient.  Kt continues to require the following intervention to meet STG and LTG's:  PT    Recommendations:  This patient would benefit from continued therapy.     Frequency:  1 X week, once daily  Duration:  for 8 weeks        Thank you for your  referral.    INQUIRIES  Therapist: Val Benavides DPT   [facility] PH[facility]ICAL [facility]ERAP[facility]  [address]  [place] [address]  Phone: [phone]  Fax: [phone]

## 2019-10-15 ENCOUNTER — THERAPY VISIT (OUTPATIENT)
Dept: PHYSICAL THERAPY | Facility: CLINIC | Age: 44
End: 2019-10-15
Payer: COMMERCIAL

## 2019-10-15 DIAGNOSIS — M54.41 ACUTE RIGHT-SIDED LOW BACK PAIN WITH RIGHT-SIDED SCIATICA: ICD-10-CM

## 2019-10-15 PROCEDURE — 97140 MANUAL THERAPY 1/> REGIONS: CPT | Mod: GP | Performed by: PHYSICAL THERAPIST

## 2019-10-15 PROCEDURE — 97112 NEUROMUSCULAR REEDUCATION: CPT | Mod: GP | Performed by: PHYSICAL THERAPIST

## 2019-10-15 PROCEDURE — 97110 THERAPEUTIC EXERCISES: CPT | Mod: GP | Performed by: PHYSICAL THERAPIST

## 2019-10-24 ENCOUNTER — THERAPY VISIT (OUTPATIENT)
Dept: PHYSICAL THERAPY | Facility: CLINIC | Age: 44
End: 2019-10-24
Payer: COMMERCIAL

## 2019-10-24 DIAGNOSIS — M54.41 ACUTE RIGHT-SIDED LOW BACK PAIN WITH RIGHT-SIDED SCIATICA: ICD-10-CM

## 2019-10-24 PROCEDURE — 97110 THERAPEUTIC EXERCISES: CPT | Mod: GP | Performed by: PHYSICAL THERAPIST

## 2019-10-24 PROCEDURE — 97140 MANUAL THERAPY 1/> REGIONS: CPT | Mod: GP | Performed by: PHYSICAL THERAPIST

## 2019-10-24 PROCEDURE — 97112 NEUROMUSCULAR REEDUCATION: CPT | Mod: GP | Performed by: PHYSICAL THERAPIST

## 2019-10-31 ENCOUNTER — THERAPY VISIT (OUTPATIENT)
Dept: PHYSICAL THERAPY | Facility: CLINIC | Age: 44
End: 2019-10-31
Payer: COMMERCIAL

## 2019-10-31 DIAGNOSIS — M54.41 ACUTE RIGHT-SIDED LOW BACK PAIN WITH RIGHT-SIDED SCIATICA: ICD-10-CM

## 2019-10-31 PROCEDURE — 97112 NEUROMUSCULAR REEDUCATION: CPT | Mod: GP | Performed by: PHYSICAL THERAPIST

## 2019-10-31 PROCEDURE — 97110 THERAPEUTIC EXERCISES: CPT | Mod: GP | Performed by: PHYSICAL THERAPIST

## 2019-10-31 PROCEDURE — 97140 MANUAL THERAPY 1/> REGIONS: CPT | Mod: GP | Performed by: PHYSICAL THERAPIST

## 2019-11-07 ENCOUNTER — THERAPY VISIT (OUTPATIENT)
Dept: PHYSICAL THERAPY | Facility: CLINIC | Age: 44
End: 2019-11-07
Payer: COMMERCIAL

## 2019-11-07 DIAGNOSIS — M54.41 ACUTE RIGHT-SIDED LOW BACK PAIN WITH RIGHT-SIDED SCIATICA: ICD-10-CM

## 2019-11-07 PROCEDURE — 97112 NEUROMUSCULAR REEDUCATION: CPT | Mod: GP | Performed by: PHYSICAL THERAPIST

## 2019-11-07 PROCEDURE — 97110 THERAPEUTIC EXERCISES: CPT | Mod: GP | Performed by: PHYSICAL THERAPIST

## 2019-11-07 PROCEDURE — 97140 MANUAL THERAPY 1/> REGIONS: CPT | Mod: GP | Performed by: PHYSICAL THERAPIST

## 2019-11-12 ENCOUNTER — THERAPY VISIT (OUTPATIENT)
Dept: PHYSICAL THERAPY | Facility: CLINIC | Age: 44
End: 2019-11-12
Payer: COMMERCIAL

## 2019-11-12 DIAGNOSIS — M54.41 ACUTE RIGHT-SIDED LOW BACK PAIN WITH RIGHT-SIDED SCIATICA: ICD-10-CM

## 2019-11-12 PROCEDURE — 97140 MANUAL THERAPY 1/> REGIONS: CPT | Mod: GP | Performed by: PHYSICAL THERAPIST

## 2019-11-12 PROCEDURE — 97110 THERAPEUTIC EXERCISES: CPT | Mod: GP | Performed by: PHYSICAL THERAPIST

## 2019-11-12 PROCEDURE — 97112 NEUROMUSCULAR REEDUCATION: CPT | Mod: GP | Performed by: PHYSICAL THERAPIST

## 2019-11-18 ENCOUNTER — THERAPY VISIT (OUTPATIENT)
Dept: PHYSICAL THERAPY | Facility: CLINIC | Age: 44
End: 2019-11-18
Payer: COMMERCIAL

## 2019-11-18 DIAGNOSIS — S73.191A TEAR OF RIGHT ACETABULAR LABRUM, INITIAL ENCOUNTER: ICD-10-CM

## 2019-11-18 DIAGNOSIS — Z98.890 HISTORY OF HIP SURGERY: ICD-10-CM

## 2019-11-18 DIAGNOSIS — M25.851 HIP IMPINGEMENT SYNDROME, RIGHT: ICD-10-CM

## 2019-11-18 PROCEDURE — 97110 THERAPEUTIC EXERCISES: CPT | Mod: GP

## 2019-11-18 PROCEDURE — 97530 THERAPEUTIC ACTIVITIES: CPT | Mod: GP

## 2019-11-18 ASSESSMENT — ACTIVITIES OF DAILY LIVING (ADL)
WALKING_15_MINUTES_OR_GREATER: SLIGHT DIFFICULTY
HOS_ADL_ITEM_SCORE_TOTAL: 50
WALKING_UP_STEEP_HILLS: MODERATE DIFFICULTY
STEPPING_UP_AND_DOWN_CURBS: SLIGHT DIFFICULTY
HOS_ADL_HIGHEST_POTENTIAL_SCORE: 68
TWISTING/PIVOTING_ON_INVOLVED_LEG: MODERATE DIFFICULTY
HEAVY_WORK: SLIGHT DIFFICULTY
GOING_UP_1_FLIGHT_OF_STAIRS: NO DIFFICULTY AT ALL
HOS_ADL_COUNT: 17
WALKING_APPROXIMATELY_10_MINUTES: NO DIFFICULTY AT ALL
ROLLING_OVER_IN_BED: SLIGHT DIFFICULTY
PUTTING_ON_SOCKS_AND_SHOES: SLIGHT DIFFICULTY
GETTING_INTO_AND_OUT_OF_AN_AVERAGE_CAR: NO DIFFICULTY AT ALL
STANDING_FOR_15_MINUTES: SLIGHT DIFFICULTY
WALKING_INITIALLY: MODERATE DIFFICULTY
LIGHT_TO_MODERATE_WORK: NO DIFFICULTY AT ALL
RECREATIONAL_ACTIVITIES: MODERATE DIFFICULTY
SITTING_FOR_15_MINUTES: SLIGHT DIFFICULTY
HOS_ADL_SCORE(%): 73.53
WALKING_DOWN_STEEP_HILLS: MODERATE DIFFICULTY
DEEP_SQUATTING: MODERATE DIFFICULTY
GOING_DOWN_1_FLIGHT_OF_STAIRS: NO DIFFICULTY AT ALL
GETTING_INTO_AND_OUT_OF_A_BATHTUB: SLIGHT DIFFICULTY

## 2019-11-18 NOTE — PROGRESS NOTES
Subjective:  HPI                    Objective:  System                                           Hip Evaluation  HIP AROM:    Flexion: Left:   Right:  115 (pinch at end)     Extension: Left:   Right:  15  Abduction: Left:    Right:  40      Internal Rotation: Left:   Right: 30 (pinch at end)  External Rotation: Left:   Right: 50 (tight at end)        Hip Strength:      Extension:  Right: 4+/5    Pain:    Abduction:  Right: 4+/5    Pain:                                 General     ROS    Assessment/Plan:    PROGRESS  REPORT        SUBJECTIVE  Subjective changes noted by patient:  Still working with Val regarding spine.  I just try to forget about the hip and focus on the spine  Changes in function:  Yes (See Goal flowsheet attached for changes in current functional level)  Adverse reaction to treatment or activity: None    OBJECTIVE  Changes noted in objective findings:  The objective findings below are from DOS 11/18/19.        ASSESSMENT/PLAN  Updated problem list and treatment plan: Diagnosis 1: S/P R hip labral repair with aimee work and associated SI and spine dysfunction     Decreased ROM/flexibility - manual therapy and therapeutic exercise  Decreased strength - therapeutic exercise and therapeutic activities  Impaired muscle performance - neuro re-education  Decreased function - therapeutic activities  STG/LTGs have been met or progress has been made towards goals:  Yes (See Goal flow sheet completed today.)  Assessment of Progress: The patient's condition is improving.  The patient's condition has potential to improve.  Self Management Plans:  Patient has been instructed in a home treatment program.  I have re-evaluated this patient and find that the nature, scope, duration and intensity of the therapy is appropriate for the medical condition of the patient.  Kt continues to require the following intervention to meet STG and LTG's:  PT    Recommendations:  This patient would benefit from continued  therapy.     Frequency:  1 X every 2-3 months, once daily  Duration:  for 2-4 visits - but also may continue with hip program with Val.          Please refer to the daily flowsheet for treatment today, total treatment time and time spent performing 1:1 timed codes.

## 2019-11-29 ENCOUNTER — THERAPY VISIT (OUTPATIENT)
Dept: PHYSICAL THERAPY | Facility: CLINIC | Age: 44
End: 2019-11-29
Payer: COMMERCIAL

## 2019-11-29 DIAGNOSIS — M54.41 ACUTE RIGHT-SIDED LOW BACK PAIN WITH RIGHT-SIDED SCIATICA: ICD-10-CM

## 2019-11-29 PROCEDURE — 97110 THERAPEUTIC EXERCISES: CPT | Mod: GP | Performed by: PHYSICAL THERAPIST

## 2019-11-29 PROCEDURE — 97112 NEUROMUSCULAR REEDUCATION: CPT | Mod: GP | Performed by: PHYSICAL THERAPIST

## 2019-11-29 PROCEDURE — 97140 MANUAL THERAPY 1/> REGIONS: CPT | Mod: GP | Performed by: PHYSICAL THERAPIST

## 2019-12-12 ENCOUNTER — THERAPY VISIT (OUTPATIENT)
Dept: PHYSICAL THERAPY | Facility: CLINIC | Age: 44
End: 2019-12-12
Payer: COMMERCIAL

## 2019-12-12 DIAGNOSIS — M54.41 ACUTE RIGHT-SIDED LOW BACK PAIN WITH RIGHT-SIDED SCIATICA: ICD-10-CM

## 2019-12-12 PROCEDURE — 97112 NEUROMUSCULAR REEDUCATION: CPT | Mod: GP | Performed by: PHYSICAL THERAPIST

## 2019-12-12 PROCEDURE — 97530 THERAPEUTIC ACTIVITIES: CPT | Mod: GP | Performed by: PHYSICAL THERAPIST

## 2019-12-12 PROCEDURE — 97110 THERAPEUTIC EXERCISES: CPT | Mod: GP | Performed by: PHYSICAL THERAPIST

## 2019-12-16 ENCOUNTER — THERAPY VISIT (OUTPATIENT)
Dept: PHYSICAL THERAPY | Facility: CLINIC | Age: 44
End: 2019-12-16
Payer: COMMERCIAL

## 2019-12-16 DIAGNOSIS — M54.12 CERVICAL RADICULOPATHY: ICD-10-CM

## 2019-12-16 DIAGNOSIS — M54.6 ACUTE BILATERAL THORACIC BACK PAIN: ICD-10-CM

## 2019-12-16 DIAGNOSIS — M54.2 NECK PAIN: ICD-10-CM

## 2019-12-16 PROCEDURE — 97530 THERAPEUTIC ACTIVITIES: CPT | Mod: GP | Performed by: PHYSICAL THERAPIST

## 2019-12-16 PROCEDURE — 97161 PT EVAL LOW COMPLEX 20 MIN: CPT | Mod: GP | Performed by: PHYSICAL THERAPIST

## 2019-12-16 PROCEDURE — 97110 THERAPEUTIC EXERCISES: CPT | Mod: GP | Performed by: PHYSICAL THERAPIST

## 2019-12-16 NOTE — PROGRESS NOTES
"[facility] Initial Evaluation  Subjective:  Kt presents to PT as a self-referred patient with c/o neck and upper back pain.  She reports that her pain has been on/off for a few years since doing a yoga class and having some ribs \"go out\".  It has been worse lately since dealing with low back pain and post-hip surgery.  Pain is more on L side of upper back and can be sharp, upper trap and at base of neck on both sides.  Pain can also radiate up both sides of neck, giving a HA.  Numbness in circular area between shoulder blades.  Muscles feel tighter on the L.      The history is provided by the patient. No  was used.   Kt Mast being seen for neck and upper back pain.   Date of Onset: on/off for past 3 yrs. Where condition occurred: during recreation / sport.Problem occurred: yoga/overstretching  and reported as 8/10 on pain scale. General health as reported by patient is good. Pertinent medical history includes:  Cancer.  Medical allergies: none.  Surgeries include:  Cancer surgery and orthopedic surgery (basal cell melanoma, labral tear repair in R hip).  Current medications:  None.   Primary job tasks include:  Computer work and prolonged sitting.  Pain is described as sharp and shooting (stiff, numbness and tingling) and is constant. Pain timing: activity-dependent. Progression since onset: worsened recently and not up/down but no overall lasting improvement.      Patient is [employer] . Restrictions include:  Working in normal job without restrictions.    Barriers include:  None as reported by patient.  Red flags:  None as reported by patient.  Type of problem:  Cervical spine and thoracic spine   Condition occurred with:  Repetition/overuse. This is a recurrent condition    Patient reports pain:  Central cervical spine, cervical left side, cervical right side, mid cervical spine, lower cervical spine, lower thoracic, mid thoracic, " thoracic left side and upper cervical spine. Radiates to:  Hand right and hand left (B shoulder blades, occasional hand numbness ). Associated symptoms:  Loss of motion/stiffness, numbness, tingling and headache. 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                      Objective:  Standing Alignment:    Cervical/Thoracic:  Normal (hypomobility to CT jct as assessed with PA mobs)  Shoulder/UE:  Rounded shoulders (minimal shoulder rounding)                  Flexibility/Screens:         Spine:  Decreased left spine flexibility:  Upper Trap and Levator    Decreased right spine flexibility:  Upper Trap and Levator                  Cervical/Thoracic Evaluation    Headaches: cervical  Cervical Myotomes:        C4 (shrug):  Left: 5    Right: 5  C5 (Deltoid):  Left: 5    Right: 5  C6 (Biceps):  Left: 5    Right: 5  C7 (Triceps):  Left: 5    Right: 5          Cervical Dermatomes:  normal                        Cervical Stability/Joint Clearing:  not assessed               Shoulder Evaluation:  ROM:  AROM:  : B shoulder ER 4/5 prior to rep cervical mvmts, increased to 5/5 following rep mvmts.                                                                             Dinesh Cervical Evaluation    Posture:  Sitting: fair  Standing: good  Protruding Head: yes  Wry Neck: no  Correction of Posture: better    Movement Loss:  Protrusion (PRO): pain and nil  Flexion (Flex): min and pain  Retraction (RET): min  Extension (EXT): min and pain  Lateral Flexion Right (LF R): min  Lateral Flexion Left (LF L): min  Rotation Right (ROT R): min and pain  Rotation Left (ROT L): min and pain  Test Movements:      RET: During: decreases  After: no better  Mechanical Response: no effect  Repeat RET: During:  decreases  After: no better  Mechanical Response: no effect  RET EXT: During: decreases and centralizing  After: no better  Mechanical Response: IncROM  Repeat RET EXT: During: decreases and centralizing  After: no better  Mechanical Response: IncROM  Pretest Pain Lying: prone lying with repeated retraction in ALEC position  RET (Lying): During: decreases  After: no better  Mechanical Response: IncROM  Repeat RET (Lying): During: decreases and centralizing  After: better  Mechanical Response: IncROM                    Conclusion: derangement  Principle of Treatment:  Posture Correction: lumbar roll in sitting, neutral spine in computer work, driving, viola when possible    Extension: rep retraction with self OP in prone x10-20, every 2-3 hours, seated ret/ext with or without OP if centralization pain is tolerable, every 2-3 hours    Other: pt education regarding centralization vs peripherliazation and expectations for changes in pain, ongoing POC, pt verbalized understanding                                 HENT:   Head:           Musculoskeletal:        Arms:        ROS    Assessment/Plan:    Patient is a 44 year old female with cervical and thoracic complaints.    Patient has the following significant findings with corresponding treatment plan.                Diagnosis 1:  Neck pain, upper back pain, cervical radiculopathy  Pain -  hot/cold therapy, manual therapy, self management, education, directional preference exercise and home program  Decreased ROM/flexibility - manual therapy, therapeutic exercise and home program  Decreased joint mobility - manual therapy, therapeutic exercise and home program  Decreased strength - therapeutic exercise, therapeutic activities and home program  Impaired muscle performance - neuro re-education and home program  Decreased function - therapeutic activities and home program  Impaired posture - neuro re-education and home program    Therapy Evaluation Codes:   1) History  comprised of:   Personal factors that impact the plan of care:      None.    Comorbidity factors that impact the plan of care are:      None.     Medications impacting care: None.  2) Examination of Body Systems comprised of:   Body structures and functions that impact the plan of care:      Cervical spine and Thoracic Spine.   Activity limitations that impact the plan of care are:      Driving, Lifting, Reading/Computer work, Sitting, Sports, Sleeping and viola.  3) Clinical presentation characteristics are:   Stable/Uncomplicated.  4) Decision-Making    Low complexity using standardized patient assessment instrument and/or measureable assessment of functional outcome.  Cumulative Therapy Evaluation is: Low complexity.    Previous and current functional limitations:  (See Goal Flow Sheet for this information)    Short term and Long term goals: (See Goal Flow Sheet for this information)     Communication ability:  Patient appears to be able to clearly communicate and understand verbal and written communication and follow directions correctly.  Treatment Explanation - The following has been discussed with the patient:   RX ordered/plan of care  Anticipated outcomes  Possible risks and side effects  This patient would benefit from PT intervention to resume normal activities.   Rehab potential is excellent.    Frequency:  1 X week, once daily  Duration:  for 8 weeks  Discharge Plan:  Achieve all LTG.  Independent in home treatment program.  Reach maximal therapeutic benefit.    Please refer to the daily flowsheet for treatment today, total treatment time and time spent performing 1:1 timed codes.

## 2019-12-20 ENCOUNTER — THERAPY VISIT (OUTPATIENT)
Dept: PHYSICAL THERAPY | Facility: CLINIC | Age: 44
End: 2019-12-20
Payer: COMMERCIAL

## 2019-12-20 DIAGNOSIS — M54.6 ACUTE BILATERAL THORACIC BACK PAIN: ICD-10-CM

## 2019-12-20 DIAGNOSIS — M54.2 NECK PAIN: ICD-10-CM

## 2019-12-20 DIAGNOSIS — M54.12 CERVICAL RADICULOPATHY: ICD-10-CM

## 2019-12-20 PROCEDURE — 97110 THERAPEUTIC EXERCISES: CPT | Mod: GP | Performed by: PHYSICAL THERAPIST

## 2019-12-20 PROCEDURE — 97140 MANUAL THERAPY 1/> REGIONS: CPT | Mod: GP | Performed by: PHYSICAL THERAPIST

## 2020-01-10 ENCOUNTER — THERAPY VISIT (OUTPATIENT)
Dept: PHYSICAL THERAPY | Facility: CLINIC | Age: 45
End: 2020-01-10
Payer: COMMERCIAL

## 2020-01-10 DIAGNOSIS — M54.12 CERVICAL RADICULOPATHY: ICD-10-CM

## 2020-01-10 DIAGNOSIS — M54.6 ACUTE BILATERAL THORACIC BACK PAIN: ICD-10-CM

## 2020-01-10 DIAGNOSIS — M54.2 NECK PAIN: ICD-10-CM

## 2020-01-10 PROCEDURE — 97110 THERAPEUTIC EXERCISES: CPT | Mod: GP | Performed by: PHYSICAL THERAPIST

## 2020-01-10 PROCEDURE — 97140 MANUAL THERAPY 1/> REGIONS: CPT | Mod: GP | Performed by: PHYSICAL THERAPIST

## 2020-01-17 ENCOUNTER — THERAPY VISIT (OUTPATIENT)
Dept: PHYSICAL THERAPY | Facility: CLINIC | Age: 45
End: 2020-01-17
Payer: COMMERCIAL

## 2020-01-17 DIAGNOSIS — M54.6 ACUTE BILATERAL THORACIC BACK PAIN: ICD-10-CM

## 2020-01-17 DIAGNOSIS — M54.2 NECK PAIN: ICD-10-CM

## 2020-01-17 DIAGNOSIS — M54.12 CERVICAL RADICULOPATHY: ICD-10-CM

## 2020-01-17 DIAGNOSIS — M54.41 ACUTE RIGHT-SIDED LOW BACK PAIN WITH RIGHT-SIDED SCIATICA: ICD-10-CM

## 2020-01-17 PROCEDURE — 97140 MANUAL THERAPY 1/> REGIONS: CPT | Mod: GP | Performed by: PHYSICAL THERAPIST

## 2020-01-17 PROCEDURE — 97530 THERAPEUTIC ACTIVITIES: CPT | Mod: GP | Performed by: PHYSICAL THERAPIST

## 2020-01-17 PROCEDURE — 97110 THERAPEUTIC EXERCISES: CPT | Mod: GP | Performed by: PHYSICAL THERAPIST

## 2020-01-17 PROCEDURE — 97112 NEUROMUSCULAR REEDUCATION: CPT | Mod: GP | Performed by: PHYSICAL THERAPIST

## 2020-01-24 ENCOUNTER — THERAPY VISIT (OUTPATIENT)
Dept: PHYSICAL THERAPY | Facility: CLINIC | Age: 45
End: 2020-01-24
Payer: COMMERCIAL

## 2020-01-24 DIAGNOSIS — M54.41 ACUTE RIGHT-SIDED LOW BACK PAIN WITH RIGHT-SIDED SCIATICA: ICD-10-CM

## 2020-01-24 DIAGNOSIS — M54.12 CERVICAL RADICULOPATHY: ICD-10-CM

## 2020-01-24 DIAGNOSIS — M54.2 NECK PAIN: ICD-10-CM

## 2020-01-24 DIAGNOSIS — M54.6 ACUTE BILATERAL THORACIC BACK PAIN: ICD-10-CM

## 2020-01-24 PROCEDURE — 97530 THERAPEUTIC ACTIVITIES: CPT | Mod: GP | Performed by: PHYSICAL THERAPIST

## 2020-01-24 PROCEDURE — 97140 MANUAL THERAPY 1/> REGIONS: CPT | Mod: GP | Performed by: PHYSICAL THERAPIST

## 2020-01-24 PROCEDURE — 97112 NEUROMUSCULAR REEDUCATION: CPT | Mod: GP | Performed by: PHYSICAL THERAPIST

## 2020-01-24 PROCEDURE — 97110 THERAPEUTIC EXERCISES: CPT | Mod: GP | Performed by: PHYSICAL THERAPIST

## 2020-01-24 NOTE — LETTER
[facility]  [address]  [place] [id number] [phone]    2020    Re: Kt Mast   :   1975  MRN:  [id number]   REFERRING PHYSICIAN:   Sanchez Goode    [facility]    Date of Initial Evaluation:  2019  Visits:  Rxs Used: 18  Reason for Referral:  Acute right-sided low back pain with right-sided sciatica    PROGRESS  REPORT  Progress reporting period is from 10/10/19 to 20.       SUBJECTIVE  Subjective: Kt reports that her low back pain continues to be on/off since the holidays for varying reasons, was doing quite well prior. However this week it's been more on than off and overall worse since snowshoeing 3 weeks ago.  R hip is not painful but has stiffness and she is continuing to work on strengthening as able when LBP isn't taking priority.  Hip strengthening took a backseat to lumbar treatment.  Frustrated by set-backs, every time she feels she is doing really well, there is a flare-up when she pushes it with activity.  Would like to continue PT in order to keep making progress.      Current Pain level: 3/10 (R LBP, 0-3/10).     Initial Pain level: 8/10.   Changes in function:  Yes (See Goal flowsheet attached for changes in current functional level)  Adverse reaction to treatment or activity: activity - intermittent flare ups from various activity.     OBJECTIVE  Changes noted in objective findings:  Yes, please see below.   Objective: Lumbar AROM: flex mod loss, ext min loss, B SG min loss.  R hip PROM: flex min loss, ext min loss, ER min-mod loss, IR min-mod loss, abd min loss; all hip ROM painfree, with stiffness at end-range. R hip strength: flex 5/5, ext 4+/5, abd 4/5.  Kt continues to demonstrate excellent understanding of core stabilization ex and lumbar directional preference exercise and she is consistent with her HEP and progress  with SL activities and hip strengtheing.  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  Through identification of aggravating factors and modifications/alterations to activity, tK is able to maintain low pain levels the majority of the time.  At this time, Kt is continuing to progress her mobility, strength, function however continues to have minor-moderate intermitent set-backs in her progress. I am recommending continued PT at a reduced freqency in order to continue  Re: Kt Mast   :   1975       to improve core stab and progress to full recovery of function.      ASSESSMENT/PLAN  Updated problem list and treatment plan: Diagnosis 1:  R LBP pain with R sciatica  Pain -  hot/cold therapy, manual therapy, self management, education, directional preference exercise and home program  Decreased ROM/flexibility - manual therapy, therapeutic exercise and home program  Decreased joint mobility - manual therapy, therapeutic exercise and home program  Decreased strength - therapeutic exercise, therapeutic activities and home program  Impaired balance - neuro re-education, therapeutic activities and home program  Impaired muscle performance - neuro re-education and home program  Decreased function - therapeutic activities and home program  Instability -  Therapeutic Activity  Therapeutic Exercise  home program  STG/LTGs have been met or progress has been made towards goals:  Yes (See Goal flow sheet completed today.)  Assessment of Progress: The patient's condition is improving.  The patient has had set backs in their progress.  Patient is meeting short term goals and is progressing towards long term goals.  Self Management Plans:   Patient has been instructed in a home treatment program.  I have re-evaluated this patient and find that the nature, scope, duration and intensity of the therapy is appropriate for the medical condition of the patient.  Kt continues to require the following intervention to meet STG and LTG's:  PT    Recommendations:  This patient would benefit from continued therapy.     Frequency:  2 X a month, once daily  Duration:  for 4 months    Thank you for your referral.    INQUIRIES  Therapist: Val Benavides DPT   [facility] PHYSICAL THERAPY  [address] [place] [address]. [address]  [facility] [address]  Phone: [phone]  Fax: [phone]

## 2020-01-28 NOTE — PROGRESS NOTES
Subjective:  HPI                    Objective:  System    Physical Exam    General     ROS    Assessment/Plan:    PROGRESS  REPORT    Progress reporting period is from 10/10/19 to 1/24/20.       SUBJECTIVE  Subjective: Kt reports that her low back pain continues to be on/off since the holidays for varying reasons, was doing quite well prior. However this week it's been more on than off and overall worse since snowshoeing 3 weeks ago.  R hip is not painful but has stiffness and she is continuing to work on strengthening as able when LBP isn't taking priority.  Hip strengthening took a backseat to lumbar treatment.  Frustrated by set-backs, every time she feels she is doing really well, there is a flare-up when she pushes it with activity.  Would like to continue PT in order to keep making progress.      Current Pain level: 3/10 (R LBP, 0-3/10).     Initial Pain level: 8/10.   Changes in function:  Yes (See Goal flowsheet attached for changes in current functional level)  Adverse reaction to treatment or activity: activity - intermittent flare ups from various activity.     OBJECTIVE  Changes noted in objective findings:  Yes, please see below.   Objective: Lumbar AROM: flex mod loss, ext min loss, B SG min loss.  R hip PROM: flex min loss, ext min loss, ER min-mod loss, IR min-mod loss, abd min loss; all hip ROM painfree, with stiffness at end-range. R hip strength: flex 5/5, ext 4+/5, abd 4/5.  Kt continues to demonstrate excellent understanding of core stabilization ex and lumbar directional preference exercise and she is consistent with her HEP and progress with SL activities and hip strengtheing.  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  Through identification of aggravating factors and modifications/alterations to activity, Kt is able to maintain low pain levels the majority of the time.  At this time, Kt is continuing to progress her mobility, strength, function however continues to have minor-moderate intermitent set-backs in her progress. I am recommending continued PT at a reduced freqency in order to continue to improve core stab and progress to full recovery of function.      ASSESSMENT/PLAN  Updated problem list and treatment plan: Diagnosis 1:  R LBP pain with R sciatica  Pain -  hot/cold therapy, manual therapy, self management, education, directional preference exercise and home program  Decreased ROM/flexibility - manual therapy, therapeutic exercise and home program  Decreased joint mobility - manual therapy, therapeutic exercise and home program  Decreased strength - therapeutic exercise, therapeutic activities and home program  Impaired balance - neuro re-education, therapeutic activities and home program  Impaired muscle performance - neuro re-education and home program  Decreased function - therapeutic activities and home program  Instability -  Therapeutic Activity  Therapeutic Exercise  home program  STG/LTGs have been met or progress has been made towards goals:  Yes (See Goal flow sheet completed today.)  Assessment of Progress: The patient's condition is improving.  The patient has had set backs in their progress.  Patient is meeting short term goals and is progressing towards long term goals.  Self Management Plans:  Patient has been instructed in a home treatment program.  I have re-evaluated this patient and find that the nature, scope, duration and intensity of the therapy is appropriate for the medical condition of the patient.  Kt continues to require the following intervention to meet STG and LTG's:  PT    Recommendations:  This patient would benefit from continued therapy.     Frequency:  2 X a  month, once daily  Duration:  for 4 months        Please refer to the daily flowsheet for treatment today, total treatment time and time spent performing 1:1 timed codes.

## 2020-01-31 ENCOUNTER — THERAPY VISIT (OUTPATIENT)
Dept: PHYSICAL THERAPY | Facility: CLINIC | Age: 45
End: 2020-01-31
Payer: COMMERCIAL

## 2020-01-31 DIAGNOSIS — M54.2 NECK PAIN: ICD-10-CM

## 2020-01-31 DIAGNOSIS — M54.12 CERVICAL RADICULOPATHY: ICD-10-CM

## 2020-01-31 DIAGNOSIS — M54.6 ACUTE BILATERAL THORACIC BACK PAIN: ICD-10-CM

## 2020-01-31 PROCEDURE — 97140 MANUAL THERAPY 1/> REGIONS: CPT | Mod: GP | Performed by: PHYSICAL THERAPIST

## 2020-01-31 PROCEDURE — 97110 THERAPEUTIC EXERCISES: CPT | Mod: GP | Performed by: PHYSICAL THERAPIST

## 2020-02-07 ENCOUNTER — THERAPY VISIT (OUTPATIENT)
Dept: PHYSICAL THERAPY | Facility: CLINIC | Age: 45
End: 2020-02-07
Payer: COMMERCIAL

## 2020-02-07 DIAGNOSIS — M54.41 ACUTE RIGHT-SIDED LOW BACK PAIN WITH RIGHT-SIDED SCIATICA: ICD-10-CM

## 2020-02-07 PROCEDURE — 97112 NEUROMUSCULAR REEDUCATION: CPT | Mod: GP | Performed by: PHYSICAL THERAPIST

## 2020-02-07 PROCEDURE — 97530 THERAPEUTIC ACTIVITIES: CPT | Mod: GP | Performed by: PHYSICAL THERAPIST

## 2020-02-07 PROCEDURE — 97140 MANUAL THERAPY 1/> REGIONS: CPT | Mod: GP | Performed by: PHYSICAL THERAPIST

## 2020-02-14 ENCOUNTER — THERAPY VISIT (OUTPATIENT)
Dept: PHYSICAL THERAPY | Facility: CLINIC | Age: 45
End: 2020-02-14
Payer: COMMERCIAL

## 2020-02-14 DIAGNOSIS — M54.2 NECK PAIN: ICD-10-CM

## 2020-02-14 DIAGNOSIS — M54.41 ACUTE RIGHT-SIDED LOW BACK PAIN WITH RIGHT-SIDED SCIATICA: ICD-10-CM

## 2020-02-14 DIAGNOSIS — M54.6 ACUTE BILATERAL THORACIC BACK PAIN: ICD-10-CM

## 2020-02-14 DIAGNOSIS — M54.12 CERVICAL RADICULOPATHY: ICD-10-CM

## 2020-02-14 PROCEDURE — 97110 THERAPEUTIC EXERCISES: CPT | Mod: GP | Performed by: PHYSICAL THERAPIST

## 2020-02-14 PROCEDURE — 97140 MANUAL THERAPY 1/> REGIONS: CPT | Mod: GP | Performed by: PHYSICAL THERAPIST

## 2020-02-14 PROCEDURE — 97530 THERAPEUTIC ACTIVITIES: CPT | Mod: GP | Performed by: PHYSICAL THERAPIST

## 2020-02-21 ENCOUNTER — THERAPY VISIT (OUTPATIENT)
Dept: PHYSICAL THERAPY | Facility: CLINIC | Age: 45
End: 2020-02-21
Payer: COMMERCIAL

## 2020-02-21 DIAGNOSIS — M54.2 NECK PAIN: ICD-10-CM

## 2020-02-21 DIAGNOSIS — M54.12 CERVICAL RADICULOPATHY: ICD-10-CM

## 2020-02-21 DIAGNOSIS — M54.6 ACUTE BILATERAL THORACIC BACK PAIN: ICD-10-CM

## 2020-02-21 PROCEDURE — 97140 MANUAL THERAPY 1/> REGIONS: CPT | Mod: GP | Performed by: PHYSICAL THERAPIST

## 2020-02-21 PROCEDURE — 97110 THERAPEUTIC EXERCISES: CPT | Mod: GP | Performed by: PHYSICAL THERAPIST

## 2020-02-21 NOTE — PROGRESS NOTES
Subjective:  HPI  Physical Exam                    Objective:  System    Physical Exam    General     ROS    Assessment/Plan:    PROGRESS  REPORT    Progress reporting period is from 12/16/19 to 2/21/20.       SUBJECTIVE   Subjective: Kt reports that her neck has been more bothersome since Monday and she cannot get anival of it with massage, foam roller.  Upper lower back was able to move better following her last appt and OP from  at home went well.  Lower part of low back more irritated this week with some radiation to R hip.  Want to focus on neck today.     Current Pain level: 8/10 (L shoulder/base of neck).     Previous pain level was  1/10 Initial Pain level: 8/10.   Changes in function:  Yes (See Goal flowsheet attached for changes in current functional level)  Adverse reaction to treatment or activity: None    OBJECTIVE  Objective: Cervical AROM: flex no loss, ext mod-sig loss today + pain, L SB min loss + pain, R SB min loss, L rot min loss + pain, R rot min loss.  Hypomobility to CT jct today- moderate.  RElief from ful set of retraction with OP prior to ret/ext.  Mod increase in tone to L>R UT/levators continues.  Kt continues to require PT in order to continue progressing cervical mobilty and improve function.       ASSESSMENT/PLAN  Updated problem list and treatment plan: Diagnosis 1:  neck pain  Pain -  hot/cold therapy, manual therapy, self management, education, directional preference exercise and home program  Decreased ROM/flexibility - manual therapy, therapeutic exercise and home program  Decreased joint mobility - manual therapy, therapeutic exercise and home program  Decreased function - therapeutic activities and home program  Impaired posture - neuro re-education and home program  STG/LTGs have been met or progress has been made towards goals:  Yes (See Goal flow sheet completed today.)  Assessment of Progress: The patient's condition is improving.  The patient has had set backs  in their progress.  Self Management Plans:  Patient has been instructed in a home treatment program.  Patient  has been instructed in self management of symptoms.  I have re-evaluated this patient and find that the nature, scope, duration and intensity of the therapy is appropriate for the medical condition of the patient.  Kt continues to require the following intervention to meet STG and LTG's:  PT    Recommendations:  This patient would benefit from continued therapy.     Frequency:  1 X week, once daily  Duration:  for 4 weeks        Please refer to the daily flowsheet for treatment today, total treatment time and time spent performing 1:1 timed codes.

## 2020-02-28 ENCOUNTER — THERAPY VISIT (OUTPATIENT)
Dept: PHYSICAL THERAPY | Facility: CLINIC | Age: 45
End: 2020-02-28
Payer: COMMERCIAL

## 2020-02-28 DIAGNOSIS — M54.6 ACUTE BILATERAL THORACIC BACK PAIN: ICD-10-CM

## 2020-02-28 DIAGNOSIS — M54.2 NECK PAIN: ICD-10-CM

## 2020-02-28 DIAGNOSIS — M54.12 CERVICAL RADICULOPATHY: ICD-10-CM

## 2020-02-28 PROCEDURE — 97140 MANUAL THERAPY 1/> REGIONS: CPT | Mod: GP | Performed by: PHYSICAL THERAPIST

## 2020-02-28 PROCEDURE — 97110 THERAPEUTIC EXERCISES: CPT | Mod: GP | Performed by: PHYSICAL THERAPIST

## 2020-03-11 ENCOUNTER — HEALTH MAINTENANCE LETTER (OUTPATIENT)
Age: 45
End: 2020-03-11

## 2020-03-13 ENCOUNTER — THERAPY VISIT (OUTPATIENT)
Dept: PHYSICAL THERAPY | Facility: CLINIC | Age: 45
End: 2020-03-13
Payer: COMMERCIAL

## 2020-03-13 DIAGNOSIS — M54.6 ACUTE BILATERAL THORACIC BACK PAIN: ICD-10-CM

## 2020-03-13 DIAGNOSIS — M54.2 NECK PAIN: ICD-10-CM

## 2020-03-13 DIAGNOSIS — M54.12 CERVICAL RADICULOPATHY: ICD-10-CM

## 2020-03-13 PROCEDURE — 97140 MANUAL THERAPY 1/> REGIONS: CPT | Mod: GP | Performed by: PHYSICAL THERAPIST

## 2020-03-13 PROCEDURE — 97530 THERAPEUTIC ACTIVITIES: CPT | Mod: GP | Performed by: PHYSICAL THERAPIST

## 2020-03-13 PROCEDURE — 97110 THERAPEUTIC EXERCISES: CPT | Mod: GP | Performed by: PHYSICAL THERAPIST

## 2020-03-13 NOTE — PROGRESS NOTES
Subjective:  HPI  Physical Exam                    Objective:  System    Physical Exam    General     ROS    Assessment/Plan:    DISCHARGE REPORT    Progress reporting period is from 12/16/19 to 3/13/20.       SUBJECTIVE  Subjective: Kt reports that about 1/2 the time her pain is very low, about 1-2/10.  She is doing her chin tucks with miving head back instead of nodding downward.   here to learn some manual techniques.     Current Pain level: 1/10.     Initial Pain level: 8/10.   Changes in function:  Yes (See Goal flowsheet attached for changes in current functional level)  Adverse reaction to treatment or activity: None    OBJECTIVE  Changes noted in objective findings:  Yes, please see below.   Objective: CROM: trace loss extension and B SB.  Mild hypomobility to ct jct and relief from mobiilizations.   demonstrates competence with prone inferior thoracic glides and seated retraction with OP.  At this time, Kt is continuing to have mild-mod neck and upper back pain, if she is unable to manage on her own, she has been instructed to obtain an MD order for continued cervical PT.      ASSESSMENT/PLAN  Updated problem list and treatment plan: Diagnosis 1:  neck pain  Pain -  home program  Decreased ROM/flexibility - home program  Decreased joint mobility - home program  STG/LTGs have been met or progress has been made towards goals:  Yes (See Goal flow sheet completed today.)  Assessment of Progress: The patient's condition is improving.  Patient is meeting short term goals and is progressing towards long term goals.  Self Management Plans:  Patient has been instructed in a home treatment program.  Patient  has been instructed in self management of symptoms.  I have re-evaluated this patient and find that the nature, scope, duration and intensity of the therapy is appropriate for the medical condition of the patient.  Kt continues to require the following intervention to meet STG and LTG's:   PT intervention is no longer required to meet STG/LTG.    Recommendations:  This patient is ready to be discharged from therapy and continue their home treatment program.    Please refer to the daily flowsheet for treatment today, total treatment time and time spent performing 1:1 timed codes.

## 2020-04-06 ENCOUNTER — TELEPHONE (OUTPATIENT)
Dept: PHYSICAL THERAPY | Facility: CLINIC | Age: 45
End: 2020-04-06

## 2020-04-06 DIAGNOSIS — M54.2 NECK PAIN: ICD-10-CM

## 2020-04-06 DIAGNOSIS — M54.12 CERVICAL RADICULOPATHY: ICD-10-CM

## 2020-04-06 DIAGNOSIS — M54.41 ACUTE RIGHT-SIDED LOW BACK PAIN WITH RIGHT-SIDED SCIATICA: ICD-10-CM

## 2020-04-06 DIAGNOSIS — M54.6 ACUTE BILATERAL THORACIC BACK PAIN: ICD-10-CM

## 2020-04-14 ENCOUNTER — VIRTUAL VISIT (OUTPATIENT)
Dept: PHYSICAL THERAPY | Facility: CLINIC | Age: 45
End: 2020-04-14
Payer: COMMERCIAL

## 2020-04-14 DIAGNOSIS — M54.41 ACUTE RIGHT-SIDED LOW BACK PAIN WITH RIGHT-SIDED SCIATICA: ICD-10-CM

## 2020-04-14 PROCEDURE — 97110 THERAPEUTIC EXERCISES: CPT | Mod: GT | Performed by: PHYSICAL THERAPIST

## 2020-04-14 PROCEDURE — 97112 NEUROMUSCULAR REEDUCATION: CPT | Mod: GT | Performed by: PHYSICAL THERAPIST

## 2020-04-14 PROCEDURE — 97530 THERAPEUTIC ACTIVITIES: CPT | Mod: GT | Performed by: PHYSICAL THERAPIST

## 2020-04-14 NOTE — PROGRESS NOTES
"Physical Therapy Virtual Follow Up Visit      The patient has been notified of following:     \"This virtual visit will be conducted between you and your provider. We have found that certain health care needs can be provided without the need for physical presence.  This service lets us provide the care you need with a virtual visit.\"    Due to external, as well as internal [facility] management of the COVID-19 Virus, Kt Mast was not seen in our clinic.  As a substitution, we implemented a virtual visit to manage this patient's condition utilizing the [employer] virtual visit platform via the patient s existing code.  The provider, Val Benavides, reviewed the patient's chart, PTRx prescription, and spoke with the patient to determine the following telemedicine visit is appropriate and effective for the patient's care.    The following type of visit was completed:   Video Visit:  The [employer] platform uses a synchronous HIPAA compliant video stream for this patient encounter.        S: Kt Mast is a 44 year old female. Connected virtually on the [employer] platform to discuss their condition/progress. They noted improvements in lumbar flexibility and pain.  They noted ongoing pain or limitations with lower thoracic/T-L jct pain.  Pressups with OP alleviate pain for about 30 min and the she stiffens up again.   Able to walk between 3-9 miles but she is more sore, meek after longer mileage.  Now able to complete deep neck flexor exercise with more ease.  R hip clicking/snapping in the front on longer walks. Able to perform SLS for 20-30 sec with EC. Almost able to sit renu-cross on floor (Tailor sit) and hip is loosening up.     Current pain level: 1/10 low back, up to 6-8/10 in lower thoracic spine at times.      O: Patient demonstrated good carryover with previous HEP and with  demonstrating pressups with assistant OP by  (ATC).  Suggested alteration of force to REIL with sustained extension using pillow " ramp, increased frequency of REIL with  OP (pt states this alleviates her pain the most).  Discussed home work station and use of desk chair with lumbar roll and standing desk when able to avoid prolonged hip and lumbar flexion. Stressed importance of increased hip flexor stretches intermittently on longer walks and when snapping occurs, also discussed striving for increased gluteal activation in terminal stance.     PTRx Content from today's visit:  Exercise Name: All 4s Stretch - Reps: 10-20 reps, up to 5 sec hold time  Exercise Name: All 4s Cat Cow - Reps: 10 reps - Sessions: 2x/day  Exercise Name: Standing Hip Flexor Stretch, Sets: 1-2 - Reps: 10 - Sessions: every 2-3 hours  Exercise Name: Balance Single Leg Stance Supported and Unsupported, Sets: 3-5 - Reps: 20-60 seconds, as able. Goal is 60 seconds with eyes closed on floor, or 60 sec eyes open on BOSU. - Sessions: 3-5x/day, Notes: Keep hips level, abdominals tight, and stand tall. Stand at kitchen sink or near other sturdy support that will not tip.    Exercise Name: Supine Retraction Off Table, Sets: 1-2 - Reps: 10, off edge of foam roller or sturdy furniture - Sessions: every 2-3 hours or as needed  Exercise Name: Cervical Retraction With Patient Overpressure, Sets: 1-2 - Reps: 10- lie on stomach and look down at floor.  Tuck chin and push back of head towards celinuing while keeping eyes on floor.  - Sessions: Repeat every 2-3 hours , Notes:      Exercise Name: Seated Cervical Retraction Extension With Overpressure, Sets: 1 - Reps: 10 reps.   Lift chest as you look up. Add wiggle if neck pain is tolerable.   - Sessions: every 2-3 hours (6-8 x/day)  Exercise Name: Scapular Retraction/Depression - Reps: 5-10 reps, Hold each rep 5-10 seconds gently.   - Sessions: Throughout the day, Notes: Perform as much as possible throughout the day.    Exercise Name: Posture Correction with Lumbar Roll - Reps: Perform in car, work and home as much as possible.    Exercise Name: Prone On Elbows - Reps: 4-5x for 5-10 breaths, SUPPORT HEAD IN HANDS- for UPPER back stretch - Sessions: 2x/day or more if needed, Notes: Keep trunk, butt and leg muscles relaxed.  Allow your low back to sag as you exhale.    Exercise Name: Prone Press Ups, Sets: 1-2 - Reps: 10 - Sessions: every 2-3 hours, Notes: Keep trunk, legs and butt relaxed.  STOP if pain increases in the hip or goes further down.  OK to feel soreness/stiffness more in the back.    Exercise Name: Repeated Extension in Lying with Lock/Sag, Sets: 1 - Reps: 10-15- Force progressions: increase hold time up to 5 sec, add weight over low back and/or put hands on books, etc to give better stretch - Sessions: every 2-3 hours , Notes: Keep trunk, legs and butt relaxed.  STOP if pain increases in the legs or goes further down.  OK to feel soreness/stiffness in the back.    Exercise Name: Repeated Extension in Lying with Hips Shifted, Sets: 1-2 - Reps: 10. Lie on stomach and move shoulders to the R.  Keep butt relaxed.   - Sessions: every 2-3 hours or as symptoms dictate, Notes: STOP if pain increases in the legs or goes further down and lasts beyond 5 min.  OK to feel soreness/stiffness in the back.    Exercise Name: Standing Sideglide, Sets: 1-2 - Reps: 10.  L shoulder and L hip to the wall.  Use R hand to push hips to the wall- keep body relaxed.   - Sessions: every 2-3 hours or as needed  Exercise Name: Foam Roller Thoracic Extension - Reps: 5-10 reps at most painful site after rolling-butt on ground, keep elbows pointing to ceiling  Exercise Name: Prone Arm Raise #2, Sets: 2 - Reps: 8-15, hold 3-5 seconds - Sessions: 1x, EVERY OTHER DAY  Exercise Name: Single Leg Bridge, Sets: 2 - Reps: 10-30- stop bridge 2a (above) when it gets easy - Sessions: 3x/week  Exercise Name: Prone Plank - Reps: 3, hold 45- 60 seconds - Sessions: 1x, EVERY OTHER DAY  Exercise Name: Side Plank - Reps: 2-3x, both sides, hold 10-30 - Sessions:  3x/week  Exercise Name: Supine Abdominal Exercise #9A (Arm/Leg Extension With Feet Off the Floor), Sets: 3-4 - Reps: 10-20 reps (If straightening leg is too difficult or becomes painful, then return to alternating toe taps - Sessions: 1x, EVERY OTHER DAY  Exercise Name: Body Mechanics - [place] - Reps: use this form for mini-bends throughout the day (kitchen, bathroom tasks)  Exercise Name: Squat, Sets: 2 - Reps: 10-20- ON BOSU (dome side) with hand support nearby - Sessions: 1x, EVERY OTHER DAY, Notes: Focus on keeping butt back and not allowing knees to go over toes.  Stay in a pain-free range.  Exercise Name: birddog, Sets: 2 - Reps: 10-30 reps- reach/kick to 30-45 degrees .  Keeping back stable and avoid tipping in the pelvis.  - Sessions: 3x/week  Exercise Name: Upper Cervical/Deep Neck Flexor Strengthening, Sets: 2 - Reps: 10 - Sessions: 1x, every other day    A:   Patient is not progressing as expected.  Response to therapy has shown an improvement in  pain level and flexibility in the lower lumbar spine and R hip. Pain remains in lower thoracic/upper lumbar spine.     P: Patient will continue with the exercise program assigned on their PTRx code and will add the following measures to manage their pain/condition: increased frequency of REIL with assistant OP, alteration to sustained extension.      Current treatment program is being advanced to more complex exercises.      Virtual visit contact time    Time of service began: 1:30 PM  Time of service ended: 2:15  PM  Total Time for set up, visit, and documentation: 60 minutes    Payor: [employer] / Plan: [employer] BIND / Product Type: PPO /   .  Procedure Code/s   Therapeutic Exercise (78159): 20 minutes  Neuromuscular Re-education (54195): 15 minutes  Therapeutic Activities (98301): 10 minutes    I have reviewed the note as documented above.  This accurately captures the substance of my conversation with the patient.  Provider location: [facility], [place] [employer], [place] (City/State)  Patient location: Home

## 2020-04-21 ENCOUNTER — VIRTUAL VISIT (OUTPATIENT)
Dept: PHYSICAL THERAPY | Facility: CLINIC | Age: 45
End: 2020-04-21
Payer: COMMERCIAL

## 2020-04-21 DIAGNOSIS — M54.41 ACUTE RIGHT-SIDED LOW BACK PAIN WITH RIGHT-SIDED SCIATICA: ICD-10-CM

## 2020-04-21 PROCEDURE — 97110 THERAPEUTIC EXERCISES: CPT | Mod: GT | Performed by: PHYSICAL THERAPIST

## 2020-04-21 PROCEDURE — 97530 THERAPEUTIC ACTIVITIES: CPT | Mod: GT | Performed by: PHYSICAL THERAPIST

## 2020-04-21 NOTE — PROGRESS NOTES
"Physical Therapy Virtual Follow Up Visit      The patient has been notified of following:     \"This virtual visit will be conducted between you and your provider. We have found that certain health care needs can be provided without the need for physical presence.  This service lets us provide the care you need with a virtual visit.\"    Due to external, as well as internal [facility] management of the COVID-19 Virus, Kt Mast was not seen in our clinic.  As a substitution, we implemented a virtual visit to manage this patient's condition utilizing the [employer] virtual visit platform via the patient s existing code.  The provider, Val Benavides, reviewed the patient's chart, PTRx prescription, and spoke with the patient to determine the following telemedicine visit is appropriate and effective for the patient's care.    The following type of visit was completed:   Video Visit:  The [employer] platform uses a synchronous HIPAA compliant video stream for this patient encounter.        S: Kt Mast is a 44 year old female. Connected virtually on the [employer] platform to discuss their condition/progress. Kt reports that her pain is not better this week.  She had been doing pressups every 2 hours and having  help. Pressups used to reduce pain for 30 min after but she no longer gets that relief either.   Current pain level: 6-9/10 in upper low back.     O: Patient demonstrated L lateral shift in walking and decreased thoracolumbar rotation.  Trial of pressups demonstrates excellent carryover with form however increase in T-L pain up to 9/10 at end-range.  Trial of shifted pressup reduced pain during the motion slightly but no improvement in overall PL. Trial of return to Rep SGIS produced lumbar strain but no radiation of pain to hip during or after.  Following 2x10 Rep SGIS, pt demonstrated decreased lateral shift in gait and reported \"feeling more loose\". Return to shifted pressup following SGIS and pt reports " pain up to 6/10 at end-range.  Kt has been instructed to return to REP SGIS, every 2-3 hours or as needed for the next 2 days, then try returning to shifted pressups.  If pain reduction occurs with shifted pressups, then continue and progress to regular pressups as able.  Pt instructed to use lateral repeated movements as necessary if pain increases with extension or fails to reduce with extension.      PTRx Content from today's visit:  Exercise Name: Standing Sideglide, Sets: 1-2 - Reps: 10.  L shoulder and L hip to the wall.  Use R hand to push hips to the wall- keep body relaxed.   - Sessions: every 2-3 hours or as needed  Exercise Name: Repeated Extension in Lying with Hips Shifted, Sets: 1-2 - Reps: 10. Lie on stomach and move shoulders to the R.  Keep butt relaxed.   - Sessions: every 2-3 hours or as symptoms dictate, Notes: STOP if pain increases in the legs or goes further down and lasts beyond 5 min.  OK to feel soreness/stiffness in the back.    Exercise Name: Prone On Elbows - Reps: 4-5x for 5-10 breaths, SUPPORT HEAD IN HANDS- for UPPER back stretch - Sessions: 2x/day or more if needed, Notes: Keep trunk, butt and leg muscles relaxed.  Allow your low back to sag as you exhale.    Exercise Name: Prone Press Ups, Sets: 1-2 - Reps: 10 - Sessions: every 2-3 hours, Notes: Keep trunk, legs and butt relaxed.  STOP if pain increases in the hip or goes further down.  OK to feel soreness/stiffness more in the back.    Exercise Name: Repeated Extension in Lying with Lock/Sag, Sets: 1 - Reps: 10-15- Force progressions: increase hold time up to 5 sec, add weight over low back and/or put hands on books, etc to give better stretch - Sessions: every 2-3 hours , Notes: Keep trunk, legs and butt relaxed.  STOP if pain increases in the legs or goes further down.  OK to feel soreness/stiffness in the back.    Exercise Name: All 4s Stretch - Reps: 10-20 reps, up to 5 sec hold time  Exercise Name: All 4s Cat Cow - Reps:  10 reps - Sessions: 2x/day  Exercise Name: Standing Hip Flexor Stretch, Sets: 1-2 - Reps: 10 - Sessions: every 2-3 hours  Exercise Name: Balance Single Leg Stance Supported and Unsupported, Sets: 3-5 - Reps: 20-60 seconds, as able. Goal is 60 seconds with eyes closed on floor, or 60 sec eyes open on BOSU. - Sessions: 3-5x/day, Notes: Keep hips level, abdominals tight, and stand tall. Stand at kitchen sink or near other sturdy support that will not tip.    Exercise Name: Supine Retraction Off Table, Sets: 1-2 - Reps: 10, off edge of foam roller or sturdy furniture - Sessions: every 2-3 hours or as needed  Exercise Name: Cervical Retraction With Patient Overpressure, Sets: 1-2 - Reps: 10- lie on stomach and look down at floor.  Tuck chin and push back of head towards celinuing while keeping eyes on floor.  - Sessions: Repeat every 2-3 hours , Notes:      Exercise Name: Seated Cervical Retraction Extension With Overpressure, Sets: 1 - Reps: 10 reps.   Lift chest as you look up. Add wiggle if neck pain is tolerable.   - Sessions: every 2-3 hours (6-8 x/day)  Exercise Name: Scapular Retraction/Depression - Reps: 5-10 reps, Hold each rep 5-10 seconds gently.   - Sessions: Throughout the day, Notes: Perform as much as possible throughout the day.    Exercise Name: Posture Correction with Lumbar Roll - Reps: Perform in car, work and home as much as possible.   Exercise Name: Foam Roller Thoracic Extension - Reps: 5-10 reps at most painful site after rolling-butt on ground, keep elbows pointing to ceiling  Exercise Name: Prone Arm Raise #2, Sets: 2 - Reps: 8-15, hold 3-5 seconds - Sessions: 1x, EVERY OTHER DAY  Exercise Name: Single Leg Bridge, Sets: 2 - Reps: 10-30- stop bridge 2a (above) when it gets easy - Sessions: 3x/week  Exercise Name: Prone Plank - Reps: 3, hold 45- 60 seconds - Sessions: 1x, EVERY OTHER DAY  Exercise Name: Side Plank - Reps: 2-3x, both sides, hold 10-30 - Sessions: 3x/week  Exercise Name: Supine  Abdominal Exercise #9A (Arm/Leg Extension With Feet Off the Floor), Sets: 3-4 - Reps: 10-20 reps (If straightening leg is too difficult or becomes painful, then return to alternating toe taps - Sessions: 1x, EVERY OTHER DAY  Exercise Name: Body Mechanics - [employer] [place] - Reps: use this form for mini-bends throughout the day (kitchen, bathroom tasks)  Exercise Name: Squat, Sets: 2 - Reps: 10-20- ON BOSU (dome side) with hand support nearby - Sessions: 1x, EVERY OTHER DAY, Notes: Focus on keeping butt back and not allowing knees to go over toes.  Stay in a pain-free range.  Exercise Name: birddog, Sets: 2 - Reps: 10-30 reps- reach/kick to 30-45 degrees .  Keeping back stable and avoid tipping in the pelvis.  - Sessions: 3x/week  Exercise Name: Upper Cervical/Deep Neck Flexor Strengthening, Sets: 2 - Reps: 10 - Sessions: 1x, every other day    A:   No change of symptoms has been noted.  Response to therapy has shown lack of progress in  pain level.  P: Patient will continue with the exercise program assigned on their PTRx code and will add the following measures to manage their pain/condition: reps SGIS for 48 hours, prior to trail of return to shifted pressups.      Current treatment program is being advanced to more complex exercises.      Virtual visit contact time    Time of service began: 1:38 PM  Time of service ended: 2:12 PM  Total Time for set up, visit, and documentation: 42 minutes    Payor: PREFERREDONE / Plan: PREFERREDONE BIND / Product Type: PPO /   .  Procedure Code/s   Therapeutic Exercise (63086): 25 minutes  Therapeutic Activities (73419): 10 minutes    I have reviewed the note as documented above.  This accurately captures the substance of my conversation with the patient.  Provider location: [facility], [place], [place]   Patient location: Home

## 2020-05-05 ENCOUNTER — VIRTUAL VISIT (OUTPATIENT)
Dept: PHYSICAL THERAPY | Facility: CLINIC | Age: 45
End: 2020-05-05
Payer: COMMERCIAL

## 2020-05-05 DIAGNOSIS — M54.41 ACUTE RIGHT-SIDED LOW BACK PAIN WITH RIGHT-SIDED SCIATICA: ICD-10-CM

## 2020-05-05 PROBLEM — M54.6 ACUTE BILATERAL THORACIC BACK PAIN: Status: RESOLVED | Noted: 2019-12-16 | Resolved: 2020-05-05

## 2020-05-05 PROBLEM — M54.2 NECK PAIN: Status: RESOLVED | Noted: 2019-12-16 | Resolved: 2020-05-05

## 2020-05-05 PROBLEM — M54.12 CERVICAL RADICULOPATHY: Status: RESOLVED | Noted: 2019-12-16 | Resolved: 2020-05-05

## 2020-05-05 PROCEDURE — 97530 THERAPEUTIC ACTIVITIES: CPT | Mod: GT | Performed by: PHYSICAL THERAPIST

## 2020-05-05 PROCEDURE — 97110 THERAPEUTIC EXERCISES: CPT | Mod: GT | Performed by: PHYSICAL THERAPIST

## 2020-05-05 NOTE — PROGRESS NOTES
"Physical Therapy Virtual Follow Up Visit      The patient has been notified of following:     \"This virtual visit will be conducted between you and your provider. We have found that certain health care needs can be provided without the need for physical presence.  This service lets us provide the care you need with a virtual visit.\"    Due to external, as well as internal [facility] management of the COVID-19 Virus, Kt Mast was not seen in our clinic.  As a substitution, we implemented a virtual visit to manage this patient's condition utilizing the [employer] virtual visit platform via the patient s existing code.  The provider, Val Benavides, reviewed the patient's chart, PTRx prescription, and spoke with the patient to determine the following telemedicine visit is appropriate and effective for the patient's care.    The following type of visit was completed:   Video Visit:  The [employer] platform uses a synchronous HIPAA compliant video stream for this patient encounter.        S: Kt Mast is a 44 year old female. Connected virtually on the [employer] platform to discuss their condition/progress. They noted improvements in decreased pain overall, but can still get up to 9/10 with certain movements, sitting, bending, twisting and extension.  Has been doing up to 5 alternating sets of SGIS and shifted pressups, every 2 hours.  Pain now focused to what feels like one vertebrae rather than a section. Pain did not wake her up for the last 2 nights. New desk chair with pillow better for sitting.  Relief from stretches last a little longer.  Still painful 70% of the time and \"less pain\" 30% of the time.  Pt c/o vertigo for past 6 weeks, has been improving somewhat but still occurs with L SL and extension of neck.       Current pain level: 4/10      O: Patient demonstrated L lateral shift in gait.  Significant increase in pain to 8/10 with SGIS.  Discussion regarding lack of relief from SGIS and ext, despite ample pt " efforts and consistency with repeated movements.  Trial of rep rot in flexion with pt reporting no pain during (only stretch) and relief of LBP to 3/10 following.  Pt demonstrated improved appearance of shift in gait and improved lumbar rotation.   Recommended undergoing Vestiular PT eval and provided number for [facility] for evaluation of vertigo. .      PTRx Content from today's visit:  Pt HEP has inclusions for lumbar force progressions, addition of rotation in flexion today with instructions for performing only that ex until instructed on further progression.    Exercise Name: Lumbar Flexion Rotation, Sets: 1-2 - Reps: 10 - Sessions: every 2-3 hours  Exercise Name: Standing Sideglide, Sets: 1-2 - Reps: 10.  L shoulder and L hip to the wall.  Use R hand to push hips to the wall- keep body relaxed.   - Sessions: every 2-3 hours or as needed  Exercise Name: Repeated Extension in Lying with Hips Shifted, Sets: 1-2 - Reps: 10. Lie on stomach and move shoulders to the R.  Keep butt relaxed.   - Sessions: every 2-3 hours or as symptoms dictate, Notes: STOP if pain increases in the legs or goes further down and lasts beyond 5 min.  OK to feel soreness/stiffness in the back.    Exercise Name: Prone On Elbows - Reps: 4-5x for 5-10 breaths, SUPPORT HEAD IN HANDS- for UPPER back stretch - Sessions: 2x/day or more if needed, Notes: Keep trunk, butt and leg muscles relaxed.  Allow your low back to sag as you exhale.    Exercise Name: Prone Press Ups, Sets: 1-2 - Reps: 10 - Sessions: every 2-3 hours, Notes: Keep trunk, legs and butt relaxed.  STOP if pain increases in the hip or goes further down.  OK to feel soreness/stiffness more in the back.    Exercise Name: Repeated Extension in Lying with Lock/Sag, Sets: 1 - Reps: 10-15- Force progressions: increase hold time up to 5 sec, add weight over low back and/or put hands on books, etc to give better stretch - Sessions: every 2-3 hours , Notes: Keep trunk, legs and  butt relaxed.  STOP if pain increases in the legs or goes further down.  OK to feel soreness/stiffness in the back.    Exercise Name: All 4s Stretch - Reps: 10-20 reps, up to 5 sec hold time  Exercise Name: All 4s Cat Cow - Reps: 10 reps - Sessions: 2x/day  Exercise Name: Standing Hip Flexor Stretch, Sets: 1-2 - Reps: 10 - Sessions: every 2-3 hours  Exercise Name: Balance Single Leg Stance Supported and Unsupported, Sets: 3-5 - Reps: 20-60 seconds, as able. Goal is 60 seconds with eyes closed on floor, or 60 sec eyes open on BOSU. - Sessions: 3-5x/day, Notes: Keep hips level, abdominals tight, and stand tall. Stand at kitchen sink or near other sturdy support that will not tip.    Exercise Name: Supine Retraction Off Table, Sets: 1-2 - Reps: 10, off edge of foam roller or sturdy furniture - Sessions: every 2-3 hours or as needed  Exercise Name: Cervical Retraction With Patient Overpressure, Sets: 1-2 - Reps: 10- lie on stomach and look down at floor.  Tuck chin and push back of head towards celinuing while keeping eyes on floor.  - Sessions: Repeat every 2-3 hours , Notes:      Exercise Name: Seated Cervical Retraction Extension With Overpressure, Sets: 1 - Reps: 10 reps.   Lift chest as you look up. Add wiggle if neck pain is tolerable.   - Sessions: every 2-3 hours (6-8 x/day)  Exercise Name: Scapular Retraction/Depression - Reps: 5-10 reps, Hold each rep 5-10 seconds gently.   - Sessions: Throughout the day, Notes: Perform as much as possible throughout the day.    Exercise Name: Posture Correction with Lumbar Roll - Reps: Perform in car, work and home as much as possible.   Exercise Name: Foam Roller Thoracic Extension - Reps: 5-10 reps at most painful site after rolling-butt on ground, keep elbows pointing to ceiling  Exercise Name: Prone Arm Raise #2, Sets: 2 - Reps: 8-15, hold 3-5 seconds - Sessions: 1x, EVERY OTHER DAY  Exercise Name: Single Leg Bridge, Sets: 2 - Reps: 10-30- stop bridge 2a (above) when it  gets easy - Sessions: 3x/week  Exercise Name: Prone Plank - Reps: 3, hold 45- 60 seconds - Sessions: 1x, EVERY OTHER DAY  Exercise Name: Side Plank - Reps: 2-3x, both sides, hold 10-30 - Sessions: 3x/week  Exercise Name: Supine Abdominal Exercise #9A (Arm/Leg Extension With Feet Off the Floor), Sets: 3-4 - Reps: 10-20 reps (If straightening leg is too difficult or becomes painful, then return to alternating toe taps - Sessions: 1x, EVERY OTHER DAY  Exercise Name: Body Mechanics - [employer] [place] - Reps: use this form for mini-bends throughout the day (kitchen, bathroom tasks)  Exercise Name: Squat, Sets: 2 - Reps: 10-20- ON BOSU (dome side) with hand support nearby - Sessions: 1x, EVERY OTHER DAY, Notes: Focus on keeping butt back and not allowing knees to go over toes.  Stay in a pain-free range.  Exercise Name: birddog, Sets: 2 - Reps: 10-30 reps- reach/kick to 30-45 degrees .  Keeping back stable and avoid tipping in the pelvis.  - Sessions: 3x/week  Exercise Name: Upper Cervical/Deep Neck Flexor Strengthening, Sets: 2 - Reps: 10 - Sessions: 1x, every other day    A:   Patient is progressing slower than anticipated.   Response to therapy has shown an improvement in  pain level.       P: Patient will continue with the exercise program assigned on their PTRx code and will add the following measures to manage their pain/condition: rep rot in flexion x10-20, every 2-3 hours or as needed for relief.      Current treatment program is being advanced to more complex exercises.      Virtual visit contact time    Time of service began: 1:30 PM  Time of service ended: 2:08 PM  Total Time for set up, visit, and documentation: 46 minutes    Payor: PREFERREDONE / Plan: PREFERREDONE BIND / Product Type: PPO /   .  Procedure Code/s   Therapeutic Exercise (52818): 25 minutes  Therapeutic Activities (68117): 10 minutes    I have reviewed the note as documented above.  This accurately captures the substance of my conversation with the  patient.  Provider location: [facility], [place], [place]   Patient location: Home

## 2020-05-19 ENCOUNTER — VIRTUAL VISIT (OUTPATIENT)
Dept: PHYSICAL THERAPY | Facility: CLINIC | Age: 45
End: 2020-05-19
Payer: COMMERCIAL

## 2020-05-19 DIAGNOSIS — M54.41 ACUTE RIGHT-SIDED LOW BACK PAIN WITH RIGHT-SIDED SCIATICA: ICD-10-CM

## 2020-05-19 PROCEDURE — 97530 THERAPEUTIC ACTIVITIES: CPT | Mod: GT | Performed by: PHYSICAL THERAPIST

## 2020-05-19 PROCEDURE — 97110 THERAPEUTIC EXERCISES: CPT | Mod: GT | Performed by: PHYSICAL THERAPIST

## 2020-05-19 NOTE — PROGRESS NOTES
"Physical Therapy Virtual Follow Up Visit      The patient has been notified of following:     \"This virtual visit will be conducted between you and your provider. We have found that certain health care needs can be provided without the need for physical presence.  This service lets us provide the care you need with a virtual visit.\"    Due to external, as well as internal [facility] management of the COVID-19 Virus, Kt Mast was not seen in our clinic.  As a substitution, we implemented a virtual visit to manage this patient's condition utilizing the [employer] virtual visit platform via the patient s existing code.  The provider, Val Benavides, reviewed the patient's chart, PTRx prescription, and spoke with the patient to determine the following telemedicine visit is appropriate and effective for the patient's care.    The following type of visit was completed:   Video Visit:  The [employer] platform uses a synchronous HIPAA compliant video stream for this patient encounter.        S: Kt Mast is a 44 year old female. Connected virtually on the [employer] platform to discuss their condition/progress. Kt reports that overall her back has been a little better.  For the first time on Sunday she was pain free for about an hour.  Performing SGIS followed by REIL, but pain can increase up to 7-8/10 during the exercise and progressively reduces to 3/10.  Pain still up to 7-8/10 with 30 min of sitting. Current pain level: 3/10    O: Patient demonstrated lack of lumbar rotation in gait and R lateral shift in gait.  NC in pain with SGIS or REIL and pain in T-L jct limits extension in standing.  Discussed break in repeated movements to allow tissue healing and possibly prevent hypermobility from high frequency of multiple repeated movements.  Kt returns to see Dr. Goode on 6/1/20 and would like to f.u with PT when we return to clinic hours.  Progress at home has plateaued and pt likely requires additional hands-on " assessment and treatment.       PTRx Content from today's visit:  Exercise Name: Standing Sideglide, Sets: 1-2 - Reps: 10.  L shoulder and L hip to the wall.  Use R hand to push hips to the wall- keep body relaxed.   - Sessions: every 2-3 hours or as needed  Exercise Name: Repeated Extension in Lying with Hips Shifted, Sets: 1-2 - Reps: 10. Lie on stomach and move shoulders to the R.  Keep butt relaxed.   - Sessions: every 2-3 hours or as symptoms dictate, Notes: STOP if pain increases in the legs or goes further down and lasts beyond 5 min.  OK to feel soreness/stiffness in the back.    Exercise Name: Prone On Elbows - Reps: 4-5x for 5-10 breaths, SUPPORT HEAD IN HANDS- for UPPER back stretch - Sessions: 2x/day or more if needed, Notes: Keep trunk, butt and leg muscles relaxed.  Allow your low back to sag as you exhale.    Exercise Name: Prone Press Ups, Sets: 1-2 - Reps: 10 - Sessions: every 2-3 hours, Notes: Keep trunk, legs and butt relaxed.  STOP if pain increases in the hip or goes further down.  OK to feel soreness/stiffness more in the back.    Exercise Name: Repeated Extension in Lying with Lock/Sag, Sets: 1 - Reps: 10-15- Force progressions: increase hold time up to 5 sec, add weight over low back and/or put hands on books, etc to give better stretch - Sessions: every 2-3 hours , Notes: Keep trunk, legs and butt relaxed.  STOP if pain increases in the legs or goes further down.  OK to feel soreness/stiffness in the back.    Exercise Name: All 4s Stretch - Reps: 10-20 reps, up to 5 sec hold time  Exercise Name: All 4s Cat Cow - Reps: 10 reps - Sessions: 2x/day  Exercise Name: Standing Hip Flexor Stretch, Sets: 1-2 - Reps: 10 - Sessions: every 2-3 hours  Exercise Name: Balance Single Leg Stance Supported and Unsupported, Sets: 3-5 - Reps: 20-60 seconds, as able. Goal is 60 seconds with eyes closed on floor, or 60 sec eyes open on BOSU. - Sessions: 3-5x/day, Notes: Keep hips level, abdominals tight, and stand  tall. Stand at kitchen sink or near other sturdy support that will not tip.    Exercise Name: Supine Retraction Off Table, Sets: 1-2 - Reps: 10, off edge of foam roller or sturdy furniture - Sessions: every 2-3 hours or as needed  Exercise Name: Cervical Retraction With Patient Overpressure, Sets: 1-2 - Reps: 10- lie on stomach and look down at floor.  Tuck chin and push back of head towards celinuing while keeping eyes on floor.  - Sessions: Repeat every 2-3 hours , Notes:      Exercise Name: Seated Cervical Retraction Extension With Overpressure, Sets: 1 - Reps: 10 reps.   Lift chest as you look up. Add wiggle if neck pain is tolerable.   - Sessions: every 2-3 hours (6-8 x/day)  Exercise Name: Scapular Retraction/Depression - Reps: 5-10 reps, Hold each rep 5-10 seconds gently.   - Sessions: Throughout the day, Notes: Perform as much as possible throughout the day.    Exercise Name: Posture Correction with Lumbar Roll - Reps: Perform in car, work and home as much as possible.   Exercise Name: Foam Roller Thoracic Extension - Reps: 5-10 reps at most painful site after rolling-butt on ground, keep elbows pointing to ceiling  Exercise Name: Prone Arm Raise #2, Sets: 2 - Reps: 8-15, hold 3-5 seconds - Sessions: 1x, EVERY OTHER DAY  Exercise Name: Single Leg Bridge, Sets: 2 - Reps: 10-30- stop bridge 2a (above) when it gets easy - Sessions: 3x/week  Exercise Name: Prone Plank - Reps: 3, hold 45- 60 seconds - Sessions: 1x, EVERY OTHER DAY  Exercise Name: Side Plank - Reps: 2-3x, both sides, hold 10-30 - Sessions: 3x/week  Exercise Name: Supine Abdominal Exercise #9A (Arm/Leg Extension With Feet Off the Floor), Sets: 3-4 - Reps: 10-20 reps (If straightening leg is too difficult or becomes painful, then return to alternating toe taps - Sessions: 1x, EVERY OTHER DAY  Exercise Name: Body Mechanics - [employer] [place] - Reps: use this form for mini-bends throughout the day (kitchen, bathroom tasks)  Exercise Name: Squat, Sets: 2 -  Reps: 10-20- ON BOSU (dome side) with hand support nearby - Sessions: 1x, EVERY OTHER DAY, Notes: Focus on keeping butt back and not allowing knees to go over toes.  Stay in a pain-free range.  Exercise Name: birddog, Sets: 2 - Reps: 10-30 reps- reach/kick to 30-45 degrees .  Keeping back stable and avoid tipping in the pelvis.  - Sessions: 3x/week  Exercise Name: Upper Cervical/Deep Neck Flexor Strengthening, Sets: 2 - Reps: 10 - Sessions: 1x, every other day    A:   Patient is not progressing as expected.  Response to therapy has shown lack of progress in  pain level    P: Patient will continue with the exercise program assigned on their PTRx code and will add the following measures to manage their pain/condition: relative rest from repeated movements, return to short walks and mid-range movements with pain-free core stab ex.    Frequency and/or duration have been changed to:  New Duration:  1x/week for 2 weeks (remainder of Rx) when clinic re-opens.       Virtual visit contact time    Time of service began: 1:30 PM  Time of service ended: 2:05 PM  Total Time for set up, visit, and documentation: 42 minutes    Payor: PREFERREDONE / Plan: PREFERREDONE BIND / Product Type: PPO /   .  Procedure Code/s   Therapeutic Exercise (67684): 15 minutes  Therapeutic Activities (97875): 20 minutes    I have reviewed the note as documented above.  This accurately captures the substance of my conversation with the patient.  Provider location: [place], [place] ([facility])  Patient location: Home

## 2020-10-21 PROBLEM — M25.851 HIP IMPINGEMENT SYNDROME, RIGHT: Status: RESOLVED | Noted: 2019-04-23 | Resolved: 2020-10-21

## 2020-10-21 PROBLEM — M54.41 ACUTE RIGHT-SIDED LOW BACK PAIN WITH RIGHT-SIDED SCIATICA: Status: RESOLVED | Noted: 2019-07-30 | Resolved: 2020-10-21

## 2020-10-21 PROBLEM — S73.191A TEAR OF RIGHT ACETABULAR LABRUM: Status: RESOLVED | Noted: 2019-04-23 | Resolved: 2020-10-21

## 2020-10-21 PROBLEM — Z98.890 HISTORY OF HIP SURGERY: Status: RESOLVED | Noted: 2019-04-23 | Resolved: 2020-10-21

## 2020-10-21 NOTE — PROGRESS NOTES
Kt was not seen for additional follow up visits, attributed to COVID-19 shut down.  Current status is unknown.  Discharge at this time.

## 2021-01-03 ENCOUNTER — HEALTH MAINTENANCE LETTER (OUTPATIENT)
Age: 46
End: 2021-01-03

## 2021-03-07 ENCOUNTER — HEALTH MAINTENANCE LETTER (OUTPATIENT)
Age: 46
End: 2021-03-07

## 2021-04-25 ENCOUNTER — HEALTH MAINTENANCE LETTER (OUTPATIENT)
Age: 46
End: 2021-04-25

## 2021-10-10 ENCOUNTER — HEALTH MAINTENANCE LETTER (OUTPATIENT)
Age: 46
End: 2021-10-10

## 2022-03-26 ENCOUNTER — HEALTH MAINTENANCE LETTER (OUTPATIENT)
Age: 47
End: 2022-03-26

## 2022-05-21 ENCOUNTER — HEALTH MAINTENANCE LETTER (OUTPATIENT)
Age: 47
End: 2022-05-21

## 2022-09-18 ENCOUNTER — HEALTH MAINTENANCE LETTER (OUTPATIENT)
Age: 47
End: 2022-09-18

## 2023-05-07 ENCOUNTER — HEALTH MAINTENANCE LETTER (OUTPATIENT)
Age: 48
End: 2023-05-07

## 2023-06-04 ENCOUNTER — HEALTH MAINTENANCE LETTER (OUTPATIENT)
Age: 48
End: 2023-06-04